# Patient Record
Sex: MALE | Race: OTHER | HISPANIC OR LATINO | URBAN - METROPOLITAN AREA
[De-identification: names, ages, dates, MRNs, and addresses within clinical notes are randomized per-mention and may not be internally consistent; named-entity substitution may affect disease eponyms.]

---
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
## 2020-01-21 NOTE — ED PROVIDER NOTE - CROS ED NEURO ALL NEG
Patient: Edmond Gaytan Date of Service: 2020   : 1951 MRN: [id number]     SUBJECTIVE:     HISTORY OF PRESENT ILLNESS:  Edmond Gaytan is a 68 year old male who presents today for follow up    Doing home therapy exercises  When he was going to PT, he was having fatigue the day after     Was getting better, then as he was not going the last 3 weeks he feels he has gotten weaker     No falls     Most of the time does not have pain, comes and goes on his back depending on what he is doing   Thinks triggered today from anxiety   Gets worked up leaving the house       No SE to the meds that he remembers    Sleep- able to sleeps 11-12 hours daily     Eating ok    Bowels- moving regularly   Urinating regularly too      PAST MEDICAL HISTORY:  Past Medical History:   Diagnosis Date   • 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   • baclofen (LIORESAL) 10 MG tablet Take 1 tablet by mouth 3 times daily as needed (muscle spasm).   • acetaminophen (TYLENOL) 500 MG tablet    • ALPRAZolam (XANAX) 0.5 MG tablet Take 1 tablet by mouth 2 times daily as needed for Anxiety (prior to mri).   • celecoxib (CELEBREX) 200 MG capsule Take 200 mg by mouth.   • polyethylene glycol (MIRALAX) powder      No current facility-administered medications for this visit.        ALLERGIES:  ALLERGIES:  No Known Allergies    PAST SURGICAL HISTORY:  Past Surgical History:   Procedure Laterality Date   • Lumbar fusion         FAMILY HISTORY:  Family History   Problem Relation Age of Onset   • Heart disease Father        SOCIAL HISTORY:  Social History     Tobacco Use   • Smoking status: Current Some Day Smoker     Types: Cigarettes     Start date: 1966     Last attempt to quit: 2016     Years  since quitting: 3.5   • Smokeless tobacco: Never Used   • Tobacco comment: Smokes seldom.   Substance Use Topics   • Alcohol use: Yes     Alcohol/week: 7.0 - 14.0 standard drinks     Types: 7 - 14 Standard drinks or equivalent per week   • Drug use: No       Review of Systems      OBJECTIVE:     Physical Exam   Constitutional: He appears well-developed and well-nourished.   HENT:   Head: Normocephalic and atraumatic.   Eyes: Conjunctivae are normal.   Cardiovascular: Normal rate, regular rhythm and normal heart sounds.   Pulmonary/Chest: Effort normal and breath sounds normal. He has no wheezes. Musculoskeletal:      Comments: Wasting at the quads, difficulty getting up from seated position, using walker to walk     Neurological: He is alert.   Skin: Skin is warm.   Psychiatric:   dysthymic   Nursing note and vitals reviewed.      Visit Vitals  /80   Pulse 103   Temp 97.6 °F (36.4 °C)   Ht 6' (1.829 m)   Wt 71.7 kg (158 lb)   SpO2 97%   BMI 21.43 kg/m²         Wt Readings from Last 1 Encounters:   01/21/20 71.7 kg (158 lb)          Assessment AND PLAN:     This is a 68 year old year-old male who presents with    Diagnoses and all orders for this visit:  S/P lumbar spinal fusion  -     SERVICE TO PHYSICAL THERAPY  Decreased strength  -     SERVICE TO PHYSICAL THERAPY  Weakness of both lower extremities  -     SERVICE TO PHYSICAL THERAPY  Other orders  -     gabapentin (NEURONTIN) 300 MG capsule; Take 1 capsule by mouth 3 times daily.  -     baclofen (LIORESAL) 10 MG tablet; Take 1 tablet by mouth 3 times daily as needed (muscle spasm).  restart gabapentin  Likely was helping with the pain, but stopping it has exacerbated it  Cont celebrex  Add prn baclofen  Back to PT  Discussed chronic pain- recommend doing something different- art/hobby etc  Focus the energy on something else  Aware of SE of meds, take with food    The patient indicated understanding of the diagnosis and agreed with the plan of  care.      Mila Pillai, DO  1/21/2020   - - -

## 2020-07-11 ENCOUNTER — EMERGENCY (EMERGENCY)
Facility: HOSPITAL | Age: 20
LOS: 1 days | Discharge: HOME | End: 2020-07-11
Attending: STUDENT IN AN ORGANIZED HEALTH CARE EDUCATION/TRAINING PROGRAM
Payer: COMMERCIAL

## 2020-07-11 VITALS
SYSTOLIC BLOOD PRESSURE: 121 MMHG | OXYGEN SATURATION: 100 % | RESPIRATION RATE: 18 BRPM | DIASTOLIC BLOOD PRESSURE: 76 MMHG | TEMPERATURE: 98 F | HEART RATE: 56 BPM

## 2020-07-11 VITALS
HEIGHT: 72 IN | RESPIRATION RATE: 20 BRPM | WEIGHT: 199.96 LBS | OXYGEN SATURATION: 98 % | TEMPERATURE: 98 F | HEART RATE: 71 BPM | DIASTOLIC BLOOD PRESSURE: 82 MMHG | SYSTOLIC BLOOD PRESSURE: 129 MMHG

## 2020-07-11 LAB
ALBUMIN SERPL ELPH-MCNC: 4.4 G/DL — SIGNIFICANT CHANGE UP (ref 3.3–5.2)
ALP SERPL-CCNC: 87 U/L — SIGNIFICANT CHANGE UP (ref 40–120)
ALT FLD-CCNC: 11 U/L — SIGNIFICANT CHANGE UP
ANION GAP SERPL CALC-SCNC: 15 MMOL/L — SIGNIFICANT CHANGE UP (ref 5–17)
AST SERPL-CCNC: 12 U/L — SIGNIFICANT CHANGE UP
BASOPHILS # BLD AUTO: 0.04 K/UL — SIGNIFICANT CHANGE UP (ref 0–0.2)
BASOPHILS NFR BLD AUTO: 0.2 % — SIGNIFICANT CHANGE UP (ref 0–2)
BILIRUB SERPL-MCNC: 0.9 MG/DL — SIGNIFICANT CHANGE UP (ref 0.4–2)
BUN SERPL-MCNC: 9 MG/DL — SIGNIFICANT CHANGE UP (ref 8–20)
CALCIUM SERPL-MCNC: 9.4 MG/DL — SIGNIFICANT CHANGE UP (ref 8.6–10.2)
CHLORIDE SERPL-SCNC: 102 MMOL/L — SIGNIFICANT CHANGE UP (ref 98–107)
CO2 SERPL-SCNC: 21 MMOL/L — LOW (ref 22–29)
CREAT SERPL-MCNC: 0.76 MG/DL — SIGNIFICANT CHANGE UP (ref 0.5–1.3)
EOSINOPHIL # BLD AUTO: 0.02 K/UL — SIGNIFICANT CHANGE UP (ref 0–0.5)
EOSINOPHIL NFR BLD AUTO: 0.1 % — SIGNIFICANT CHANGE UP (ref 0–6)
GLUCOSE SERPL-MCNC: 98 MG/DL — SIGNIFICANT CHANGE UP (ref 70–99)
HCT VFR BLD CALC: 42.4 % — SIGNIFICANT CHANGE UP (ref 39–50)
HGB BLD-MCNC: 14.7 G/DL — SIGNIFICANT CHANGE UP (ref 13–17)
IMM GRANULOCYTES NFR BLD AUTO: 0.7 % — SIGNIFICANT CHANGE UP (ref 0–1.5)
LACTATE BLDV-MCNC: 0.6 MMOL/L — SIGNIFICANT CHANGE UP (ref 0.5–2)
LIDOCAIN IGE QN: 11 U/L — LOW (ref 22–51)
LYMPHOCYTES # BLD AUTO: 1.83 K/UL — SIGNIFICANT CHANGE UP (ref 1–3.3)
LYMPHOCYTES # BLD AUTO: 9.4 % — LOW (ref 13–44)
MCHC RBC-ENTMCNC: 29 PG — SIGNIFICANT CHANGE UP (ref 27–34)
MCHC RBC-ENTMCNC: 34.7 GM/DL — SIGNIFICANT CHANGE UP (ref 32–36)
MCV RBC AUTO: 83.6 FL — SIGNIFICANT CHANGE UP (ref 80–100)
MONOCYTES # BLD AUTO: 2.16 K/UL — HIGH (ref 0–0.9)
MONOCYTES NFR BLD AUTO: 11.2 % — SIGNIFICANT CHANGE UP (ref 2–14)
NEUTROPHILS # BLD AUTO: 15.18 K/UL — HIGH (ref 1.8–7.4)
NEUTROPHILS NFR BLD AUTO: 78.4 % — HIGH (ref 43–77)
PLATELET # BLD AUTO: 242 K/UL — SIGNIFICANT CHANGE UP (ref 150–400)
POTASSIUM SERPL-MCNC: 3.4 MMOL/L — LOW (ref 3.5–5.3)
POTASSIUM SERPL-SCNC: 3.4 MMOL/L — LOW (ref 3.5–5.3)
PROT SERPL-MCNC: 7.9 G/DL — SIGNIFICANT CHANGE UP (ref 6.6–8.7)
RBC # BLD: 5.07 M/UL — SIGNIFICANT CHANGE UP (ref 4.2–5.8)
RBC # FLD: 12.6 % — SIGNIFICANT CHANGE UP (ref 10.3–14.5)
SODIUM SERPL-SCNC: 138 MMOL/L — SIGNIFICANT CHANGE UP (ref 135–145)
WBC # BLD: 19.37 K/UL — HIGH (ref 3.8–10.5)
WBC # FLD AUTO: 19.37 K/UL — HIGH (ref 3.8–10.5)

## 2020-07-11 PROCEDURE — 96374 THER/PROPH/DIAG INJ IV PUSH: CPT | Mod: XU

## 2020-07-11 PROCEDURE — 36415 COLL VENOUS BLD VENIPUNCTURE: CPT

## 2020-07-11 PROCEDURE — 85027 COMPLETE CBC AUTOMATED: CPT

## 2020-07-11 PROCEDURE — 83735 ASSAY OF MAGNESIUM: CPT

## 2020-07-11 PROCEDURE — 99285 EMERGENCY DEPT VISIT HI MDM: CPT

## 2020-07-11 PROCEDURE — 99284 EMERGENCY DEPT VISIT MOD MDM: CPT | Mod: 25

## 2020-07-11 PROCEDURE — 96375 TX/PRO/DX INJ NEW DRUG ADDON: CPT

## 2020-07-11 PROCEDURE — 83605 ASSAY OF LACTIC ACID: CPT

## 2020-07-11 PROCEDURE — 74177 CT ABD & PELVIS W/CONTRAST: CPT | Mod: 26

## 2020-07-11 PROCEDURE — 74177 CT ABD & PELVIS W/CONTRAST: CPT

## 2020-07-11 PROCEDURE — 80053 COMPREHEN METABOLIC PANEL: CPT

## 2020-07-11 PROCEDURE — 83690 ASSAY OF LIPASE: CPT

## 2020-07-11 RX ORDER — ONDANSETRON 8 MG/1
1 TABLET, FILM COATED ORAL
Qty: 6 | Refills: 0
Start: 2020-07-11

## 2020-07-11 RX ORDER — FAMOTIDINE 10 MG/ML
20 INJECTION INTRAVENOUS ONCE
Refills: 0 | Status: COMPLETED | OUTPATIENT
Start: 2020-07-11 | End: 2020-07-11

## 2020-07-11 RX ORDER — ONDANSETRON 8 MG/1
4 TABLET, FILM COATED ORAL ONCE
Refills: 0 | Status: COMPLETED | OUTPATIENT
Start: 2020-07-11 | End: 2020-07-11

## 2020-07-11 RX ADMIN — ONDANSETRON 4 MILLIGRAM(S): 8 TABLET, FILM COATED ORAL at 04:02

## 2020-07-11 RX ADMIN — FAMOTIDINE 20 MILLIGRAM(S): 10 INJECTION INTRAVENOUS at 04:02

## 2020-07-11 NOTE — ED PROVIDER NOTE - OBJECTIVE STATEMENT
21 y/o M pt with hx of appendectomy presenting today with cc of abdominal pain onset 12 hours pta. Pt states that he began to feel a vague, crampy abdominal pain in his epigastric region yesterday afternoon. Then tonight developed n/v and has been unable to keep any PO intake down since. States that temp at home was 102, took motrin at approx 2300. Pt denies any blood in vomitus. Last BM was tonight and was normal. Denies alcohol or drug use. Pt denies any chest pain, dyspnea, dysuria, headache, cough, congestion, sore throat, neck pain, back pain, weakness, numbness, tingling, dizziness, syncope, or other complaint.

## 2020-07-11 NOTE — ED ADULT TRIAGE NOTE - CHIEF COMPLAINT QUOTE
pt c/o abd pain and vomiting since 9pm last night and fever at home. pt states he took ibuprofen at 11pm. pt denies chest pain/SOB. denies recent travel or sick contacts.

## 2020-07-11 NOTE — ED PROVIDER NOTE - ATTENDING CONTRIBUTION TO CARE
30yo male with psh of appendectomy presents with abd pain for 6 hours. Pt states yesterday he woke up was feeling well, went to work and started to have vague abd pain. Pt states 6 hours ago started to have worsening epigastric pain and nausea and vomiting, last BM at 10pm, no diarrhea.   Tmax - 102  Const: Awake, alert and oriented. In no acute distress. Well appearing.  HEENT: NC/AT. Moist mucous membranes.  Eyes: No scleral icterus. EOMI.  Neck:. Soft and supple. Full ROM without pain.  Cardiac: Regular rate and regular rhythm. +S1/S2. Peripheral pulses 2+ and symmetric. No LE edema.  Resp: Speaking in full sentences. No evidence of respiratory distress. No wheezes, rales or rhonchi.  Abd: Soft, ttp diffusely, non-distended. Normal bowel sounds in all 4 quadrants. No guarding or rebound.  Back: Spine midline and non-tender. No CVAT.  Skin: No rashes, abrasions or lacerations.  Lymph: No cervical lymphadenopathy.  Neuro: Awake, alert & oriented x 3. Moves all extremities symmetrically.  labs, ct, GI cocktail

## 2020-07-11 NOTE — ED PROVIDER NOTE - PATIENT PORTAL LINK FT
You can access the FollowMyHealth Patient Portal offered by [facility] by registering at the following website: [email or web address]. By joining [facility]’s FollowMyHealth portal, you will also be able to view your health information using other applications (apps) compatible with our system.

## 2020-07-11 NOTE — ED PROVIDER NOTE - CLINICAL SUMMARY MEDICAL DECISION MAKING FREE TEXT BOX
19 y/o M pt with hx of appendecomy presenting with 1 day of n/v and abdominal pain. Reported fever at home, afebrile currently. Will check labs, lipase, treat for gastritis and reeval.

## 2020-07-11 NOTE — ED PROVIDER NOTE - PHYSICAL EXAMINATION
Gen: no acute distress  Head: normocephalic, atraumatic  Lung: CTAB, no respiratory distress, no wheezing, rales, rhonchi  CV: normal s1/s2, rrr  Abd: soft, mild epigastric tenderness on deep palpation, non-distended  MSK: No edema, no visible deformities, full range of motion in all 4 extremities  Neuro: No focal neurologic deficits  Skin: No rash   Psych: normal affect

## 2020-07-11 NOTE — ED ADULT NURSE REASSESSMENT NOTE - NS ED NURSE REASSESS COMMENT FT1
Patient resting in stretcher, VSS at this time. No s/s of apparent distress noted. PO challenge in progress, tolerating cary crackers and water at this time. Plan for d/c shortly.

## 2020-07-11 NOTE — ED PROVIDER NOTE - PROGRESS NOTE DETAILS
Reviewed negative w/u results with pt. Pt reports feeling much improved. Repeat abdominal exam soft and non-tender. Pt tolerating PO and is comfortable with plan for d/c. Pt agrees to f/u with pcp. Return instructions given, questions answered.

## 2020-11-23 ENCOUNTER — TRANSCRIPTION ENCOUNTER (OUTPATIENT)
Age: 20
End: 2020-11-23

## 2021-02-13 ENCOUNTER — APPOINTMENT (OUTPATIENT)
Dept: FAMILY MEDICINE | Facility: CLINIC | Age: 21
End: 2021-02-13
Payer: MEDICAID

## 2021-02-13 VITALS
TEMPERATURE: 98 F | HEART RATE: 80 BPM | OXYGEN SATURATION: 98 % | HEIGHT: 72 IN | WEIGHT: 191 LBS | SYSTOLIC BLOOD PRESSURE: 114 MMHG | BODY MASS INDEX: 25.87 KG/M2 | DIASTOLIC BLOOD PRESSURE: 70 MMHG

## 2021-02-13 DIAGNOSIS — Z23 ENCOUNTER FOR IMMUNIZATION: ICD-10-CM

## 2021-02-13 DIAGNOSIS — Z90.49 ACQUIRED ABSENCE OF OTHER SPECIFIED PARTS OF DIGESTIVE TRACT: ICD-10-CM

## 2021-02-13 DIAGNOSIS — Z13.220 ENCOUNTER FOR SCREENING FOR LIPOID DISORDERS: ICD-10-CM

## 2021-02-13 DIAGNOSIS — Z82.61 FAMILY HISTORY OF ARTHRITIS: ICD-10-CM

## 2021-02-13 DIAGNOSIS — Z13.31 ENCOUNTER FOR SCREENING FOR DEPRESSION: ICD-10-CM

## 2021-02-13 DIAGNOSIS — Z82.49 FAMILY HISTORY OF ISCHEMIC HEART DISEASE AND OTHER DISEASES OF THE CIRCULATORY SYSTEM: ICD-10-CM

## 2021-02-13 DIAGNOSIS — Z13.21 ENCOUNTER FOR SCREENING FOR NUTRITIONAL DISORDER: ICD-10-CM

## 2021-02-13 DIAGNOSIS — K21.9 GASTRO-ESOPHAGEAL REFLUX DISEASE W/OUT ESOPHAGITIS: ICD-10-CM

## 2021-02-13 PROCEDURE — 96127 BRIEF EMOTIONAL/BEHAV ASSMT: CPT

## 2021-02-13 PROCEDURE — 99385 PREV VISIT NEW AGE 18-39: CPT | Mod: 25

## 2021-02-13 PROCEDURE — 99072 ADDL SUPL MATRL&STAF TM PHE: CPT

## 2021-02-13 NOTE — HEALTH RISK ASSESSMENT
[No] : No [2] : 1) Little interest or pleasure doing things for more than half of the days (2) [0] : 2) Feeling down, depressed, or hopeless: Not at all (0) [de-identified] : exercises regularly  [] : No [de-identified] : balanced, stopped eating fast food [[id number]] : 1

## 2021-02-13 NOTE — PLAN
[FreeTextEntry1] : GERD\par - trial of PPI\par - counseled on lifestyle modifications \par \par HCM\par - f/u labs\par - encourage healthy diet and exercise

## 2021-02-13 NOTE — PHYSICAL EXAM
[No Acute Distress] : no acute distress [Well-Appearing] : well-appearing [Normal Sclera/Conjunctiva] : normal sclera/conjunctiva [PERRL] : pupils equal round and reactive to light [Normal Outer Ear/Nose] : the outer ears and nose were normal in appearance [Normal TMs] : both tympanic membranes were normal [No Respiratory Distress] : no respiratory distress  [No Accessory Muscle Use] : no accessory muscle use [Clear to Auscultation] : lungs were clear to auscultation bilaterally [Normal Rate] : normal rate  [Regular Rhythm] : with a regular rhythm [Soft] : abdomen soft [Non Tender] : non-tender [Non-distended] : non-distended [Normal Bowel Sounds] : normal bowel sounds [No Joint Swelling] : no joint swelling [Grossly Normal Strength/Tone] : grossly normal strength/tone [No Rash] : no rash [Normal Affect] : the affect was normal [No Focal Deficits] : no focal deficits [Normal Insight/Judgement] : insight and judgment were intact

## 2021-02-13 NOTE — HISTORY OF PRESENT ILLNESS
[FreeTextEntry1] : establish care, CPE [de-identified] : 20 year old male with no sig pmhx presents to establish care. He feels well overall. Admits to mild depression due to losing his job. He states it is manageable. He also complains of burning sensation in chest after eating and feels something coming up his throat. At times feels like food will come back up. Has been happening for past 3-4 months.

## 2021-02-16 LAB
25(OH)D3 SERPL-MCNC: 20.2 NG/ML
ALBUMIN SERPL ELPH-MCNC: 4.8 G/DL
ALP BLD-CCNC: 100 U/L
ALT SERPL-CCNC: 18 U/L
ANION GAP SERPL CALC-SCNC: 14 MMOL/L
AST SERPL-CCNC: 14 U/L
BASOPHILS # BLD AUTO: 0.04 K/UL
BASOPHILS NFR BLD AUTO: 0.5 %
BILIRUB SERPL-MCNC: 0.6 MG/DL
BUN SERPL-MCNC: 8 MG/DL
CALCIUM SERPL-MCNC: 9.9 MG/DL
CHLORIDE SERPL-SCNC: 101 MMOL/L
CHOLEST SERPL-MCNC: 142 MG/DL
CO2 SERPL-SCNC: 24 MMOL/L
CREAT SERPL-MCNC: 1 MG/DL
EOSINOPHIL # BLD AUTO: 0.32 K/UL
EOSINOPHIL NFR BLD AUTO: 4.3 %
ESTIMATED AVERAGE GLUCOSE: 100 MG/DL
GLUCOSE SERPL-MCNC: 85 MG/DL
HBA1C MFR BLD HPLC: 5.1 %
HCT VFR BLD CALC: 46.3 %
HDLC SERPL-MCNC: 36 MG/DL
HGB BLD-MCNC: 15.5 G/DL
IMM GRANULOCYTES NFR BLD AUTO: 0.3 %
LDLC SERPL CALC-MCNC: 91 MG/DL
LYMPHOCYTES # BLD AUTO: 2.86 K/UL
LYMPHOCYTES NFR BLD AUTO: 38.5 %
MAN DIFF?: NORMAL
MCHC RBC-ENTMCNC: 28.4 PG
MCHC RBC-ENTMCNC: 33.5 GM/DL
MCV RBC AUTO: 84.8 FL
MONOCYTES # BLD AUTO: 0.78 K/UL
MONOCYTES NFR BLD AUTO: 10.5 %
NEUTROPHILS # BLD AUTO: 3.41 K/UL
NEUTROPHILS NFR BLD AUTO: 45.9 %
NONHDLC SERPL-MCNC: 105 MG/DL
PLATELET # BLD AUTO: 293 K/UL
POTASSIUM SERPL-SCNC: 4.1 MMOL/L
PROT SERPL-MCNC: 7.9 G/DL
RBC # BLD: 5.46 M/UL
RBC # FLD: 12.8 %
SODIUM SERPL-SCNC: 139 MMOL/L
TRIGL SERPL-MCNC: 73 MG/DL
TSH SERPL-ACNC: 2.45 UIU/ML
WBC # FLD AUTO: 7.43 K/UL

## 2021-02-18 ENCOUNTER — APPOINTMENT (OUTPATIENT)
Dept: FAMILY MEDICINE | Facility: CLINIC | Age: 21
End: 2021-02-18

## 2021-02-18 DIAGNOSIS — Z20.822 CONTACT WITH AND (SUSPECTED) EXPOSURE TO COVID-19: ICD-10-CM

## 2021-04-01 DIAGNOSIS — Z11.59 ENCOUNTER FOR SCREENING FOR OTHER VIRAL DISEASES: ICD-10-CM

## 2021-04-02 LAB
COVID-19 NUCLEOCAPSID  GAM ANTIBODY INTERPRETATION: NEGATIVE
SARS-COV-2 AB SERPL QL IA: 0.07 INDEX

## 2021-09-05 NOTE — DISCHARGE NOTE PROVIDER - NSCORESITESY/N_GEN_A_CORE_RD
[facility]  Progress Note Pilar Palacios 1947, 76 y o  male MRN: [id number]  Unit/Bed#: -01 Encounter: [id number]  Primary Care Provider: Apple Pagan   Date and time admitted to hospital: 9/3/2021 11:36 AM    Gram-negative bacteremia  Assessment & Plan  Patient does have 1/2 blood cultures positive for Gram Negative Bhupinder Enteric Like    Source for this is unclear  He does have some bronchitis but this typically would not cause Gram-negative bacteremia  Abdominal exam is benign  No clinical signs of cholecystitis  CBD dilated but chronically apparently  No cholestatic pattern  Denies any diarrhea    Continue antibiotics, currently on aztreonam vancomycin  Follow-up final results of cultures    * Sepsis [facility])  Assessment & Plan  · Patient stated he woke up with fever and rigors the morning of admission and did state he had an episode of diarrhea today    Patient denies eating anything out of the ordinary yesterday and denies eating any shellfish or undercooked meats  · Patient is vaccinated and no on else in the family is currently sick  · Sepsis with unknown source; patient with temp of 101 7°, respirations 22, platelets 88, and lactic acid of 2 3  · AST and ALT markedly elevated  · CT chest abdomen pelvis-mild bronchial wall thickening; no acute abnormality within abdomen or pelvis; several enhancing foci within posterior right hepatic lobe possibly representing hemangiomas; horseshoe kidney  · Chest x-ray-no acute cardiopulmonary disease  · Lactic acid 2 3  · WBC count normal    Possibilities include tracheobronchitis, infectious diarrhea/viral now resolved, or more concerning left knee prosthetic joint infection    Plan  · Continue aztreonam given cefazolin and penicillin allergy  · Continue vancomycin as well for now  · Follow-up final results of cultures  · Obtain orthopedics consultation given L knee pain previously and hx of joint infection - case discussed, they will obtain x-rays  · Monitor clinically, temperature curve, white count    DM (diabetes mellitus), type 2 [facility])  Assessment & Plan  No results found for: HGBA1C    Recent Labs     09/04/21  1120 09/04/21  1622 09/04/21  2042 09/05/21  0722   POCGLU 179* 216* 228* 165*       Blood Sugar Average: Last 72 hrs:  · (P) 062 [id number] hold oral antihyperglycemics  · Correctional scale insulin  · Glargine 28 units at night  · lispro 12 units with dinner  · NovoLog 70/3022 units in the morning      Thrombocytopathia (HCC)  Assessment & Plan  · Platelets 88; upon chart review patient has had slight downtrend in platelets and baseline appears to be around 100  · May be secondary to sepsis  · No signs of bleeding  · Monitor    Elevated troponin  Assessment & Plan  · Mildly elevated troponin with flat curve is likely non MI troponin elevation  Patient denies any chest pain  Transaminitis  Assessment & Plan  · Noted to have elevated AST and ALT  · Hepatitis panel negative  · Right upper quadrant ultrasound reveals cholelithiasis but no cholecystitis, no CBD dilation  · Etiology may be secondary to sepsis and fatty liver; will continue monitor    Monitor CMP    Hypothyroidism  Assessment & Plan  Continue levothyroxine 50 mcg daily    Hypomagnesemia  Assessment & Plan  · Magnesium was replaced  · Currently much better    Monitor    Hyperlipidemia associated with type 2 diabetes mellitus ([facility] [address] )  Assessment & Plan  Given markedly elevated AST and ALT will hold statin at this time      Horseshoe kidney with renal calculus  Assessment & Plan  Plan is under CKD    History of endocarditis  Assessment & Plan  · History of endocarditis in 2013 with bioprosthetic aortic valve placed in 2013       CKD (chronic kidney disease) stage 3, GFR 30-59 ml/min [facility])  Assessment & Plan  Lab Results   Component Value Date    EGFR 78 09/05/2021    EGFR 63 09/04/2021    EGFR 58 09/03/2021    CREATININE 0 96 09/05/2021 CREATININE 1 14 2021    CREATININE 1 22 2021     · Patient with history of horseshoe kidney  · Creatinine at baseline currently; baseline appears to be 1-1 2  · Continue to monitor    Benign prostatic hyperplasia without lower urinary tract symptoms  Assessment & Plan  Continue tamsulosin    CAD (coronary artery disease)  Assessment & Plan  · Stable at this time  · Continue propranolol 60 mg daily      VTE Pharmacologic Prophylaxis:   Pharmacologic: Heparin  Mechanical VTE Prophylaxis in Place: Yes    Patient Centered Rounds: I have performed bedside rounds with nursing staff today  Discussions with Specialists or Other Care Team Provider:  Discussed with care management team    Education and Discussions with Family / Patient:  Patient    Time Spent for Care: 30 minutes  More than 50% of total time spent on counseling and coordination of care as described above  Current Length of Stay: 2 day(s)    Current Patient Status: Inpatient   Certification Statement: The patient will continue to require additional inpatient hospital stay due to need for IV antibiotics    Discharge Plan:  Once stable    Code Status: Level 3 - DNAR and DNI      Subjective:     Patient evaluated this morning  Pain left knee is much better  He has blood culture is positive on 2  No further fevers  Other events reported  Objective:     Vitals:   Temp (24hrs), Av 6 °F (37 °C), Min:98 4 °F (36 9 °C), Max:98 7 °F (37 1 °C)    Temp:  [98 4 °F (36 9 °C)-98 7 °F (37 1 °C)] 98 7 °F (37 1 °C)  HR:  [56-77] 65  Resp:  [17-20] 17  BP: (131-175)/(60-86) 175/86  SpO2:  [90 %-95 %] 95 %  Body mass index is 35 16 kg/m²  Input and Output Summary (last 24 hours): Intake/Output Summary (Last 24 hours) at 2021 1331  Last data filed at 2021 0912  Gross per 24 hour   Intake 420 ml   Output 3900 ml   Net -3480 ml       Physical Exam:     Physical Exam  Vitals and nursing note reviewed     Constitutional:       Appearance: Normal appearance  Comments: Elderly male in bed, awake   HENT:      Head: Normocephalic and atraumatic  Right Ear: External ear normal       Left Ear: External ear normal       Nose: Nose normal  No congestion or rhinorrhea  Mouth/Throat:      Mouth: Mucous membranes are moist       Pharynx: Oropharynx is clear  No oropharyngeal exudate or posterior oropharyngeal erythema  Eyes:      General: No scleral icterus  Right eye: No discharge  Left eye: No discharge  Pupils: Pupils are equal, round, and reactive to light  Neck:      Vascular: No carotid bruit  Cardiovascular:      Rate and Rhythm: Normal rate and regular rhythm  Pulses: Normal pulses  Heart sounds: No murmur heard  No friction rub  No gallop  Pulmonary:      Effort: Pulmonary effort is normal  No respiratory distress  Breath sounds: Normal breath sounds  No stridor  No wheezing, rhonchi or rales  Abdominal:      General: Abdomen is flat  Bowel sounds are normal  There is no distension  Palpations: Abdomen is soft  There is no mass  Tenderness: There is no abdominal tenderness  There is no guarding or rebound  Hernia: No hernia is present  Musculoskeletal:         General: No swelling, tenderness, deformity or signs of injury  Normal range of motion  Cervical back: Normal range of motion  No rigidity  No muscular tenderness  Comments: Left knee exam is actually improved today   Lymphadenopathy:      Cervical: No cervical adenopathy  Skin:     General: Skin is warm and dry  Capillary Refill: Capillary refill takes less than 2 seconds  Coloration: Skin is not jaundiced or pale  Findings: No bruising or erythema  Neurological:      General: No focal deficit present  Mental Status: He is alert and oriented to person, place, and time  Mental status is at baseline  Cranial Nerves: No cranial nerve deficit  Sensory: No sensory deficit  Motor: No weakness  Coordination: Coordination normal       Deep Tendon Reflexes: Reflexes normal    Psychiatric:         Mood and Affect: Mood normal          Behavior: Behavior normal          Thought Content: Thought content normal          Judgment: Judgment normal            Additional Data:     Labs:    Results from last 7 days   Lab Units 09/05/21  0651   WBC Thousand/uL 4 53   HEMOGLOBIN g/dL 15 1   HEMATOCRIT % 45 5   PLATELETS Thousands/uL 83*   NEUTROS PCT % 65   LYMPHS PCT % 13*   MONOS PCT % 10   EOS PCT % 11*     Results from last 7 days   Lab Units 09/05/21  0651   SODIUM mmol/L 136   POTASSIUM mmol/L 4 6   CHLORIDE mmol/L 104   CO2 mmol/L 22   BUN mg/dL 12   CREATININE mg/dL 0 96   ANION GAP mmol/L 10   CALCIUM mg/dL 8 6   ALBUMIN g/dL 2 8*   TOTAL BILIRUBIN mg/dL 0 90   ALK PHOS U/L 108   ALT U/L 224*   AST U/L 87*   GLUCOSE RANDOM mg/dL 187*     Results from last 7 days   Lab Units 09/03/21  1659   INR  1 18     Results from last 7 days   Lab Units 09/05/21  0722 09/04/21  2042 09/04/21  1622 09/04/21  1120 09/04/21  0714 09/03/21  2133   POC GLUCOSE mg/dl 165* 228* 216* 179* 144* 140         Results from last 7 days   Lab Units 09/04/21  0451 09/03/21  1659 09/03/21  1338 09/03/21  1149   LACTIC ACID mmol/L  --  1 9 2 3* 2 2*   PROCALCITONIN ng/ml 0 91* 1 39*  --   --            * I Have Reviewed All Lab Data Listed Above  * Additional Pertinent Lab Tests Reviewed: Maria A [facility] Admission Reviewed      Recent Cultures (last 7 days):     Results from last 7 days   Lab Units 09/03/21  1338 09/03/21  1149   BLOOD CULTURE  No Growth at 24 hrs   Enterobacter aerogenes*   GRAM STAIN RESULT   --  Gram negative rods*       Last 24 Hours Medication List:   Current Facility-Administered Medications   Medication Dose Route Frequency Provider Last Rate    acetaminophen  650 mg Oral Q6H PRN Yazan Jimenez DO      aspirin  81 mg Oral Daily Yazan Jimenez DO      aztreonam  2,000 mg Intravenous Q8H Catherne Prabhu, DO 2,000 mg (09/05/21 9832)    gabapentin  800 mg Oral TID Catherrakesh Masonst, DO      guaiFENesin  600 mg Oral Q12H [address] Laith Su MD      heparin (porcine)  5,000 Units [facility] [place]      HYDROmorphone  0 5 mg Intravenous Q6H PRN Laith Su MD      insulin aspart protamine-insulin aspart  22 Units Subcutaneous Daily Catherrakesh Masonst, DO      insulin glargine  28 Units Subcutaneous HS Catherrakesh Masonst, DO      insulin lispro  1-5 Units Subcutaneous HS Dagoberto Masonst, DO      insulin lispro  12 Units Subcutaneous Daily With Colby Chase, DO      insulin lispro  2-12 Units Subcutaneous TID [facility] Catherne Prabhu, DO      levothyroxine  50 mcg Oral Daily Catherne Prabhu, DO      loratadine  10 mg Oral Daily Dagoberto Pelletier, DO      metroNIDAZOLE  500 mg Intravenous Q8H Cathdonaldo Masonst,  mg (09/05/21 1326)    oxyCODONE  5 mg Oral Q4H PRN Dagoberto Pelletier, DO      pramipexole  1 5 mg Oral BID Cathdonaldo Pelletier, DO      propranolol  60 mg Oral Daily Dagoberto Masonst, DO      tamsulosin  0 4 mg Oral Daily With Colby Chase, DO      vancomycin  15 mg/kg (Adjusted) Intravenous Q12H Cathdonaldo Pelletier, DO          Today, Patient Was Seen By: Laith Su MD    ** Please Note: Dictation voice to text software may have been used in the creation of this document   ** No

## 2021-09-25 ENCOUNTER — APPOINTMENT (OUTPATIENT)
Dept: FAMILY MEDICINE | Facility: CLINIC | Age: 21
End: 2021-09-25
Payer: MEDICAID

## 2021-09-25 VITALS
HEIGHT: 73 IN | OXYGEN SATURATION: 97 % | HEART RATE: 96 BPM | TEMPERATURE: 98.1 F | SYSTOLIC BLOOD PRESSURE: 114 MMHG | BODY MASS INDEX: 23.46 KG/M2 | DIASTOLIC BLOOD PRESSURE: 72 MMHG | WEIGHT: 177 LBS

## 2021-09-25 DIAGNOSIS — G43.909 MIGRAINE, UNSPECIFIED, NOT INTRACTABLE, W/OUT STATUS MIGRAINOSUS: ICD-10-CM

## 2021-09-25 DIAGNOSIS — J02.9 ACUTE PHARYNGITIS, UNSPECIFIED: ICD-10-CM

## 2021-09-25 PROCEDURE — 99214 OFFICE O/P EST MOD 30 MIN: CPT

## 2021-09-25 PROCEDURE — 99072 ADDL SUPL MATRL&STAF TM PHE: CPT

## 2021-09-27 NOTE — HISTORY OF PRESENT ILLNESS
[FreeTextEntry8] : Pt c/o insomnia, headaches, and throat pains. Headaches are associated w/ photosensitivity and inc stressors. Headaches start at temples and radiate to back of head. Pt experiences headaches 2-3x a week, usually when he does not get enough sleep. Pt only sleeps for 3 hrs/night since he got COVID infx 6 mos ago. Headaches started shortly thereafter. Pt smokes marijuana daily to try to help w/ sleep and relaxation. Melatonin and Nyquil has not helped.\par Pt c/o throat discomfort x 1 day. Pt has discomfort w/ swallowing.

## 2021-09-27 NOTE — ASSESSMENT
[FreeTextEntry1] : insomnia - trial of ambien to help normalize sleep patterns. advised pt on proper sleep hygiene including decreased caffeine and screen time in the PM hours.. f/u in 2-3 weeks to assess response.  checked\par migraines - imitrex prn. Possible adverse effects discussed with pt. possibly trigger by lack of sleep, will see if improved with improvement in sleep\par pharyngitis - rapid strep neg today. start lidocaine viscous prn

## 2021-09-27 NOTE — PHYSICAL EXAM
[Coordination Grossly Intact] : coordination grossly intact [No Focal Deficits] : no focal deficits [Normal] : affect was normal and insight and judgment were intact [de-identified] : tonsillar and posterior oropharynx erythema, no exudate

## 2021-10-25 ENCOUNTER — APPOINTMENT (OUTPATIENT)
Dept: FAMILY MEDICINE | Facility: CLINIC | Age: 21
End: 2021-10-25

## 2022-04-11 PROBLEM — Z11.59 SCREENING FOR VIRAL DISEASE: Status: ACTIVE | Noted: 2021-04-01

## 2022-09-07 ENCOUNTER — APPOINTMENT (OUTPATIENT)
Dept: FAMILY MEDICINE | Facility: CLINIC | Age: 22
End: 2022-09-07

## 2022-09-07 VITALS
TEMPERATURE: 97.2 F | OXYGEN SATURATION: 99 % | WEIGHT: 192 LBS | DIASTOLIC BLOOD PRESSURE: 80 MMHG | RESPIRATION RATE: 16 BRPM | SYSTOLIC BLOOD PRESSURE: 130 MMHG | HEART RATE: 77 BPM | HEIGHT: 73 IN | BODY MASS INDEX: 25.45 KG/M2

## 2022-09-07 PROCEDURE — 99395 PREV VISIT EST AGE 18-39: CPT

## 2022-09-07 NOTE — HEALTH RISK ASSESSMENT
[Very Good] : ~his/her~  mood as very good [Former] : Former [Reports changes in hearing] : Reports no changes in hearing [Reports changes in vision] : Reports no changes in vision [Reports changes in dental health] : Reports no changes in dental health

## 2022-09-07 NOTE — HISTORY OF PRESENT ILLNESS
[FreeTextEntry1] : annual [de-identified] : annual well active regular exercise poor diet  history of IV drug abuse stopped 4 months ago clean and going to meetings denies chest pain palpitaitons or dyspnea performs self testicular exam on no daily medications non smoker

## 2022-09-12 LAB
25(OH)D3 SERPL-MCNC: 36.4 NG/ML
ALBUMIN SERPL ELPH-MCNC: 4.7 G/DL
ALP BLD-CCNC: 82 U/L
ALT SERPL-CCNC: 21 U/L
ANION GAP SERPL CALC-SCNC: 15 MMOL/L
AST SERPL-CCNC: 18 U/L
BASOPHILS # BLD AUTO: 0.03 K/UL
BASOPHILS NFR BLD AUTO: 0.5 %
BILIRUB SERPL-MCNC: 0.3 MG/DL
BUN SERPL-MCNC: 11 MG/DL
C TRACH RRNA SPEC QL NAA+PROBE: NOT DETECTED
CALCIUM SERPL-MCNC: 9.7 MG/DL
CHLORIDE SERPL-SCNC: 102 MMOL/L
CHOLEST SERPL-MCNC: 148 MG/DL
CO2 SERPL-SCNC: 25 MMOL/L
CREAT SERPL-MCNC: 0.98 MG/DL
EGFR: 112 ML/MIN/1.73M2
EOSINOPHIL # BLD AUTO: 0.28 K/UL
EOSINOPHIL NFR BLD AUTO: 4.9 %
ESTIMATED AVERAGE GLUCOSE: 103 MG/DL
GLUCOSE SERPL-MCNC: 87 MG/DL
HAV IGM SER QL: NONREACTIVE
HBA1C MFR BLD HPLC: 5.2 %
HBV CORE IGM SER QL: NONREACTIVE
HBV SURFACE AG SER QL: NONREACTIVE
HCT VFR BLD CALC: 46.4 %
HCV AB SER QL: NONREACTIVE
HCV S/CO RATIO: 0.09 S/CO
HDLC SERPL-MCNC: 38 MG/DL
HGB BLD-MCNC: 15.9 G/DL
HIV1+2 AB SPEC QL IA.RAPID: NONREACTIVE
HSV 1+2 IGG SER IA-IMP: NEGATIVE
HSV 1+2 IGG SER IA-IMP: POSITIVE
HSV1 IGG SER QL: 14.4 INDEX
HSV2 IGG SER QL: 0.2 INDEX
IMM GRANULOCYTES NFR BLD AUTO: 0.2 %
LDLC SERPL CALC-MCNC: 95 MG/DL
LYMPHOCYTES # BLD AUTO: 2.84 K/UL
LYMPHOCYTES NFR BLD AUTO: 49.4 %
MAN DIFF?: NORMAL
MCHC RBC-ENTMCNC: 29.4 PG
MCHC RBC-ENTMCNC: 34.3 GM/DL
MCV RBC AUTO: 85.8 FL
MONOCYTES # BLD AUTO: 0.53 K/UL
MONOCYTES NFR BLD AUTO: 9.2 %
N GONORRHOEA RRNA SPEC QL NAA+PROBE: NOT DETECTED
NEUTROPHILS # BLD AUTO: 2.06 K/UL
NEUTROPHILS NFR BLD AUTO: 35.8 %
NONHDLC SERPL-MCNC: 111 MG/DL
PLATELET # BLD AUTO: 291 K/UL
POTASSIUM SERPL-SCNC: 4.2 MMOL/L
PROT SERPL-MCNC: 7.6 G/DL
RBC # BLD: 5.41 M/UL
RBC # FLD: 13 %
SODIUM SERPL-SCNC: 141 MMOL/L
SOURCE AMPLIFICATION: NORMAL
T PALLIDUM AB SER QL IA: NEGATIVE
T4 SERPL-MCNC: 7.4 UG/DL
TRIGL SERPL-MCNC: 80 MG/DL
TSH SERPL-ACNC: 2.55 UIU/ML
URATE SERPL-MCNC: 6.9 MG/DL
WBC # FLD AUTO: 5.75 K/UL

## 2022-09-14 LAB
HSV1 IGM SER QL: NEGATIVE
HSV2 AB FLD-ACNC: NEGATIVE

## 2023-02-16 ENCOUNTER — APPOINTMENT (OUTPATIENT)
Dept: FAMILY MEDICINE | Facility: CLINIC | Age: 23
End: 2023-02-16
Payer: MEDICAID

## 2023-02-16 VITALS
WEIGHT: 195 LBS | SYSTOLIC BLOOD PRESSURE: 142 MMHG | HEIGHT: 73 IN | OXYGEN SATURATION: 99 % | HEART RATE: 94 BPM | DIASTOLIC BLOOD PRESSURE: 84 MMHG | BODY MASS INDEX: 25.84 KG/M2 | TEMPERATURE: 97.8 F

## 2023-02-16 DIAGNOSIS — G47.00 INSOMNIA, UNSPECIFIED: ICD-10-CM

## 2023-02-16 PROCEDURE — 99214 OFFICE O/P EST MOD 30 MIN: CPT

## 2023-02-19 NOTE — ASSESSMENT
[FreeTextEntry1] : depression, insomnia, hx drug abuse - check utox. trial of trazodone. Possible adverse effects discussed with pt. if no improvement consider ambien prn.

## 2023-02-19 NOTE — HISTORY OF PRESENT ILLNESS
[FreeTextEntry8] : Pt c/o insomnia. Pt only able to sleep for 2 hrs/night. pt very tired due to lack of sleep. Pt very stressed as he is not working as  right now and bills are increasing. Pt has tried melatonin and zzquil which didn't help. Denies hx of snoring or apneic episodes. \par Pt was feeling depressed last year, was abusing benzos, opioids, marijuana at that time. Pt reports he is 9 mos sober, going to AA meetings 4x a week.

## 2023-02-19 NOTE — HEALTH RISK ASSESSMENT
[1] : 1) Little interest or pleasure doing things for several days (1) [3] : 2) Feeling down, depressed, or hopeless for nearly every day (3) [Several Days (1)] : 1.) Little interest or pleasure in doing things? Several days [Nearly Every Day (3)] : 6.) Feeling bad about yourself, or that you are a failure, or have let yourself or your family down? Nearly every day [Not at All (0)] : 8.) Moving or speaking so slowly that other people could have noticed, or the opposite, moving or speaking faster than usual? Not at all [Moderate] : severity of depression is moderate [Very Difficult] : How difficult have these problems made it for you to do your work, take care of things at home, or get along with people? Very difficult [PHQ-2 Positive] : PHQ-2 Positive [PHQ-9 Positive] : PHQ-9 Positive [I have developed a follow-up plan documented below in the note.] : I have developed a follow-up plan documented below in the note. [[id number]] : 13 [[id number]] : 4

## 2023-02-21 LAB
AMPHET UR-MCNC: NEGATIVE NG/ML
BARBITURATES UR-MCNC: NEGATIVE NG/ML
BENZODIAZ UR-MCNC: NEGATIVE NG/ML
COCAINE METAB.OTHER UR-MCNC: NEGATIVE NG/ML
CREATININE, URINE: 195.8 MG/DL
FENTANYL, URINE: NEGATIVE NG/ML
METHADONE UR-MCNC: NEGATIVE NG/ML
OPIATES UR-MCNC: NEGATIVE NG/ML
OXYCODONE/OXYMORPHONE, URINE: NEGATIVE NG/ML
PCP UR-MCNC: NEGATIVE NG/ML
PH, URINE: 7.8
PLEASE NOTE: DRUGSCRUR: NORMAL
THC UR QL: NEGATIVE NG/ML

## 2023-03-30 NOTE — ED STATDOCS - [facility] ED SYMPTOM NEG
pt is 84 year old female with hx of prothrombin mutation, PE, HTN, DVT, cholecystectomy, presents with SOB, temp and cough admitted with septic shock 2/2 severe CAP, strep pneumo bacteremia, mediastinal adenopathy enlarging.  no abdominal distention/no fever/no vomiting/no nausea

## 2023-04-25 ENCOUNTER — APPOINTMENT (OUTPATIENT)
Dept: FAMILY MEDICINE | Facility: CLINIC | Age: 23
End: 2023-04-25
Payer: MEDICAID

## 2023-04-25 VITALS
SYSTOLIC BLOOD PRESSURE: 132 MMHG | BODY MASS INDEX: 25.84 KG/M2 | HEIGHT: 73 IN | OXYGEN SATURATION: 97 % | WEIGHT: 195 LBS | HEART RATE: 120 BPM | TEMPERATURE: 98.1 F | DIASTOLIC BLOOD PRESSURE: 80 MMHG

## 2023-04-25 DIAGNOSIS — S62.309A UNSPECIFIED FRACTURE OF UNSPECIFIED METACARPAL BONE, INITIAL ENCOUNTER FOR CLOSED FRACTURE: ICD-10-CM

## 2023-04-25 PROCEDURE — 99214 OFFICE O/P EST MOD 30 MIN: CPT

## 2023-04-25 NOTE — PHYSICAL EXAM
[Normal] : no acute distress, well nourished, well developed and well-appearing [de-identified] : R 5th metacarpal sewlling, tenderness. Pt able to move digits and sensation wnl

## 2023-04-25 NOTE — ASSESSMENT
[FreeTextEntry1] : R 5th metacarpal fx - refer to ortho. ibuprofen 800mg tid prn. RICE advised\par depression - start trial of lexapro 10mg q day. Possible adverse effects discussed with pt. f/u in 2-3 mos
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
## 2023-04-28 ENCOUNTER — NON-APPOINTMENT (OUTPATIENT)
Age: 23
End: 2023-04-28

## 2023-04-28 ENCOUNTER — APPOINTMENT (OUTPATIENT)
Dept: ORTHOPEDIC SURGERY | Facility: CLINIC | Age: 23
End: 2023-04-28
Payer: MEDICAID

## 2023-04-28 VITALS
SYSTOLIC BLOOD PRESSURE: 145 MMHG | HEART RATE: 83 BPM | WEIGHT: 195 LBS | HEIGHT: 73 IN | DIASTOLIC BLOOD PRESSURE: 91 MMHG | BODY MASS INDEX: 25.84 KG/M2

## 2023-04-28 PROCEDURE — 29125 APPL SHORT ARM SPLINT STATIC: CPT | Mod: RT

## 2023-04-28 PROCEDURE — 99203 OFFICE O/P NEW LOW 30 MIN: CPT | Mod: 25

## 2023-04-28 NOTE — END OF VISIT
[FreeTextEntry3] : This note was written by Gayla Christine on 04/28/2023 acting solely as a scribe for Dr. Prince Friedman.\par  \par All medical record entries made by the Scribe were at my, Dr. Prince Friedman, direction and personally dictated by me on 04/28/2023. I have personally reviewed the chart and agree that the record accurately reflects my personal performance of the history, physical exam, assessment and plan.

## 2023-04-28 NOTE — DISCUSSION/SUMMARY
[FreeTextEntry1] : He has findings consistent with a minimally displaced right fifth metacarpal fracture after an injury 5 days ago while bowling.\par \par I had a discussion with the patient regarding today's visit, the prognosis of this diagnosis, and treatment recommendations and options. At this time,  I did tell him that while the fracture is minimally displaced, it is overall well aligned. I therefore recommended nonoperative management given the nature of the injury as documented above. I did tell him that in most cases, I place him into a short arm cast, however given the stiffness to the digit post injury, I do not believe he will tolerate a cast. He was therefore placed into a well molded well padded fiber glass ulnar gutter splint, in the intrinsic plus position.\par \par He has agreed to the above plan of management and has expressed full understanding.  All questions were fully answered to their satisfaction. \par \par My cumulative time spent on this visit included: Preparation for the visit, review of the medical records, review of pertinent diagnostic studies, examination and counseling of the patient on the above diagnosis, treatment plan and prognosis, orders of diagnostic tests, medication and/or appropriate procedures and documentation in the medical records of today's visit.

## 2023-04-28 NOTE — PHYSICAL EXAM
[de-identified] : - Constitutional: This is a male in no obvious distress.  He is accompanied by his girlfriend today.\par - Psych: Patient is alert and oriented to person, place and time.  Patient has a normal mood and affect.\par - Cardiovascular: Normal pulses throughout the upper extremities.  No significant varicosities are noted in the upper extremities. \par - Neuro: Strength and sensation are intact throughout the upper extremities.  Patient has normal coordination.\par - Respiratory:  Patient exhibits no evidence of shortness of breath or difficulty breathing.\par - Skin: No rashes, lesions, or other abnormalities are noted in the upper extremities.\par \par --- \par \par Examination of his right hand after the splint was removed demonstrates swelling and ecchymosis along the fifth metacarpal.  He has tenderness.  There is limitation of motion.  There is no obvious rotational deformity.  He is neurovascularly intact distally. [de-identified] : I reviewed radiographs of his right hand from 04/24/2023. These demonstrated a minimally displaced fifth metacarpal shaft fracture.

## 2023-04-28 NOTE — PROCEDURE
[FreeTextEntry1] : He was placed into a well-padded and well-molded right short arm ulnar gutter fiberglass splint in the intrinsic plus position. He was instructed on protection, ice and activity modification. He will follow-up in 2 weeks for x-rays out of the splint.

## 2023-04-28 NOTE — ADDENDUM
[FreeTextEntry1] : I, Gayla Christine, acted solely as a scribe for Dr. Friedman on this date on 04/28/2023.

## 2023-04-28 NOTE — HISTORY OF PRESENT ILLNESS
[Right] : right hand dominant [FreeTextEntry1] : He comes in today for evaluation of a right fifth metacarpal fracture sustained on Sunday 4/23/23 while bowling. He was seen the following day at [facility] where xrays were obtained and revealed the fracture. He was splinted and advised to follow up with a specialist.\par \par He is accompanied by his girlfriend.\par \par He was referred by his PCP, Dr. John Reyes.

## 2023-04-28 NOTE — CONSULT LETTER
[Dear  ___] : Dear  [unfilled], [Consult Letter:] : I had the pleasure of evaluating your patient, [unfilled]. [Please see my note below.] : Please see my note below. [Consult Closing:] : Thank you very much for allowing me to participate in the care of this patient.  If you have any questions, please do not hesitate to contact me. [Sincerely,] : Sincerely, [FreeTextEntry3] : Prince Friedman M.D.\par Surgery of the Hand and Upper Extremity\par Orthopaedic Surgery\par Chief, Hand Service, [facility]\par Director, Hand Service, [facility]\par  Of Orthopaedic Surgery, [facility] [employer] [facility] at [facility]\par [employer] Email: [email or web address]\par Office Phone: [phone]

## 2023-05-04 ENCOUNTER — NON-APPOINTMENT (OUTPATIENT)
Age: 23
End: 2023-05-04

## 2023-05-12 ENCOUNTER — APPOINTMENT (OUTPATIENT)
Dept: ORTHOPEDIC SURGERY | Facility: CLINIC | Age: 23
End: 2023-05-12
Payer: MEDICAID

## 2023-05-12 PROCEDURE — 29125 APPL SHORT ARM SPLINT STATIC: CPT | Mod: RT

## 2023-05-12 PROCEDURE — 99213 OFFICE O/P EST LOW 20 MIN: CPT | Mod: 25

## 2023-05-12 PROCEDURE — 73130 X-RAY EXAM OF HAND: CPT | Mod: RT

## 2023-05-12 NOTE — DISCUSSION/SUMMARY
[FreeTextEntry1] : I had a discussion regarding today's visit, the diagnosis and treatment recommendations and options.  We also discussed changes since the last visit.  At this time, given the fracture is not yet fully healed, I recommended continued immobilization in a short arm ulnar gutter splint. \par \par The patient has agreed to the above plan of management and has expressed full understanding.  All questions were fully answered to the patient's satisfaction.\par \par My cumulative time spent on today's visit included: Preparation for the visit, review of the medical records, review of pertinent diagnostic studies, examination and counseling of the patient on the above diagnosis, treatment plan and prognosis, orders of diagnostic tests, medications and/or appropriate procedures and documentation in the medical records of today's visit.

## 2023-05-12 NOTE — PHYSICAL EXAM
[de-identified] : - Constitutional: This is a male in no obvious distress.  He is accompanied by his girlfriend today.\par - Psych: Patient is alert and oriented to person, place and time.  Patient has a normal mood and affect.\par - Cardiovascular: Normal pulses throughout the upper extremities.  No significant varicosities are noted in the upper extremities. \par - Neuro: Strength and sensation are intact throughout the upper extremities.  Patient has normal coordination.\par - Respiratory:  Patient exhibits no evidence of shortness of breath or difficulty breathing.\par - Skin: No rashes, lesions, or other abnormalities are noted in the upper extremities.\par \par --- \par \par Examination of his right hand after the splint was removed demonstrates decreased swelling.  There is residual tenderness along the fifth metacarpal.  There is limitation of flexion and extension.  There is no obvious rotational deformity.  He is neurovascularly intact distally. [de-identified] : AP, lateral, and oblique radiographs of the right hand demonstrate his fifth metacarpal shaft fracture to be healing, in good overall alignment.  There is slight displacement, which is in acceptable parameters.

## 2023-05-12 NOTE — ADDENDUM
[FreeTextEntry1] : I, Gayla Christine, acted solely as a scribe for Dr. Friedman on this date on 05/12/2023.

## 2023-05-12 NOTE — HISTORY OF PRESENT ILLNESS
[FreeTextEntry1] : Follow-up regarding right fifth metacarpal fracture after an injury while bowling 19 days ago.  He was seen in the office 2 weeks ago when he was splinted.\par \par He is having continued pain as well as mild numbness. He rates his as a 5 out of 10.\par \par He is accompanied by his girlfriend.

## 2023-05-12 NOTE — END OF VISIT
[FreeTextEntry3] : This note was written by Gayla Christine on 05/12/2023 acting solely as a scribe for Dr. Prince Friedman.\par  \par All medical record entries made by the Scribe were at my, Dr. Prince Friedman, direction and personally dictated by me on 05/12/2023. I have personally reviewed the chart and agree that the record accurately reflects my personal performance of the history, physical exam, assessment and plan.

## 2023-05-18 ENCOUNTER — NON-APPOINTMENT (OUTPATIENT)
Age: 23
End: 2023-05-18

## 2023-05-26 ENCOUNTER — APPOINTMENT (OUTPATIENT)
Dept: ORTHOPEDIC SURGERY | Facility: CLINIC | Age: 23
End: 2023-05-26
Payer: MEDICAID

## 2023-05-26 PROCEDURE — 73130 X-RAY EXAM OF HAND: CPT | Mod: RT

## 2023-05-26 PROCEDURE — 99214 OFFICE O/P EST MOD 30 MIN: CPT

## 2023-05-26 NOTE — ADDENDUM
[FreeTextEntry1] : I, Gayla Christine, acted solely as a scribe for Dr. Friedman on this date on 05/26/2023.

## 2023-05-26 NOTE — END OF VISIT
[FreeTextEntry3] : This note was written by Gayla Christine on 05/26/2023 acting solely as a scribe for Dr. Prince Friedman.\par  \par All medical record entries made by the Scribe were at my, Dr. Prince Friedman, direction and personally dictated by me on 05/26/2023. I have personally reviewed the chart and agree that the record accurately reflects my personal performance of the history, physical exam, assessment and plan.

## 2023-05-26 NOTE — HISTORY OF PRESENT ILLNESS
[FreeTextEntry1] : 33 days status post right fifth metacarpal fracture.\par \par He is experiencing mild to moderate residual pain, notably at night. He also notes occasional numbness and tingling. 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 He has taken the splint off but states not for long periods of time.  He has been doing landscaping work using the hand.  He rates his pain as a 4 out of 10 at this time. \par \par He is accompanied by his girlfriend.

## 2023-05-26 NOTE — PHYSICAL EXAM
[de-identified] : - Constitutional: This is a male in no obvious distress.  He is accompanied by his girlfriend today.\par - Psych: Patient is alert and oriented to person, place and time.  Patient has a normal mood and affect.\par - Cardiovascular: Normal pulses throughout the upper extremities.  No significant varicosities are noted in the upper extremities. \par - Neuro: Strength and sensation are intact throughout the upper extremities.  Patient has normal coordination.\par - Respiratory:  Patient exhibits no evidence of shortness of breath or difficulty breathing.\par - Skin: No rashes, lesions, or other abnormalities are noted in the upper extremities.\par \par --- \par \par Examination of his right hand after the splint was removed demonstrates decreased swelling.  There is residual although decreased tenderness along the fifth metacarpal.  There is improved flexion and extension of the digits.  There is no obvious rotational deformity.  He is neurovascularly intact distally. [de-identified] : AP, lateral, and oblique radiographs of the right hand demonstrate his fifth metacarpal shaft fracture to be healing, in alignment.  There is slight interval change in position with some further ulnar deviation on the PA.  It is well aligned on the lateral.

## 2023-05-26 NOTE — DISCUSSION/SUMMARY
[FreeTextEntry1] : I had a discussion regarding today's visit, the diagnosis and treatment recommendations and options.  We also discussed changes since the last visit.  At this time, I reviewed today's xrays in great detail with the patient. Based upon today's radiographs, the fracture has shown to partially displaced.  I explained to him that he will have a small prominence dorsally to the hand due to the displacement, however functionally I do not believe he will likely have deficits. I told him that the only way to correct the prominence would be with surgery, which I told him I would not recommend unless he were to tell me otherwise. He stated that he is not interested in undergoing surgery at all and is fine with the slight deformity. He was placed into a right carpal tunnel splint to be worn full time. He was instructed on activity modification.  I stressed to him the importance of being compliant with this and not lifting or doing strenuous activities with his right hand.  He will follow up in 2 weeks for repeat xrays. \par \par The patient has agreed to the above plan of management and has expressed full understanding.  All questions were fully answered to the patient's satisfaction.\par \par My cumulative time spent on today's visit included: Preparation for the visit, review of the medical records, review of pertinent diagnostic studies, examination and counseling of the patient on the above diagnosis, treatment plan and prognosis, orders of diagnostic tests, medications and/or appropriate procedures and documentation in the medical records of today's visit.

## 2023-05-31 ENCOUNTER — NON-APPOINTMENT (OUTPATIENT)
Age: 23
End: 2023-05-31

## 2023-06-09 ENCOUNTER — APPOINTMENT (OUTPATIENT)
Dept: ORTHOPEDIC SURGERY | Facility: CLINIC | Age: 23
End: 2023-06-09
Payer: MEDICAID

## 2023-06-12 PROBLEM — S62.306A FRACTURE OF FIFTH METACARPAL BONE OF RIGHT HAND: Status: ACTIVE | Noted: 2023-04-28

## 2023-06-19 ENCOUNTER — APPOINTMENT (OUTPATIENT)
Dept: ORTHOPEDIC SURGERY | Facility: CLINIC | Age: 23
End: 2023-06-19
Payer: MEDICAID

## 2023-06-19 DIAGNOSIS — S62.306A UNSPECIFIED FRACTURE OF FIFTH METACARPAL BONE, RIGHT HAND, INITIAL ENCOUNTER FOR CLOSED FRACTURE: ICD-10-CM

## 2023-06-19 PROCEDURE — 99213 OFFICE O/P EST LOW 20 MIN: CPT

## 2023-06-19 PROCEDURE — 73130 X-RAY EXAM OF HAND: CPT | Mod: RT

## 2023-06-19 NOTE — REASON FOR VISIT
Last refill: 1/2/2020 for #60 with 0 refills  Last visit: 1/20/2020   [FreeTextEntry2] : Right fifth metacarpal fracture

## 2023-06-19 NOTE — PHYSICAL EXAM
[de-identified] : - Constitutional: This is a male in no obvious distress. \par - Psych: Patient is alert and oriented to person, place and time.  Patient has a normal mood and affect.\par - Cardiovascular: Normal pulses throughout the upper extremities.  No significant varicosities are noted in the upper extremities. \par - Neuro: Strength and sensation are intact throughout the upper extremities.  Patient has normal coordination.\par - Respiratory:  Patient exhibits no evidence of shortness of breath or difficulty breathing.\par - Skin: No rashes, lesions, or other abnormalities are noted in the upper extremities.\par \par --- \par \par Examination of his right hand after the splint was removed demonstrates decreased swelling.  There is residual although decreased tenderness along the fifth metacarpal.  There is a palpable prominence dorsally in the region of the fracture.  He has regained near full flexion and extension.  There is no obvious rotational deformity.  He is neurovascularly intact distally. [de-identified] : AP, lateral, and oblique radiographs of the right hand demonstrate his fifth metacarpal shaft fracture to be fully healed, without interval change in alignment.  Again, there is some displacement previously noted.  The fracture is nearly consolidated.

## 2023-06-19 NOTE — DISCUSSION/SUMMARY
[FreeTextEntry1] : I had a discussion regarding today's visit, the diagnosis and treatment recommendations and options.  We also discussed changes since the last visit.  At this time, I recommended weaning off of the splint over the next 2 weeks.  He will wear it as needed during certain activities.  He will avoid heavy lifting or strenuous activities without the splint.  He will follow-up in 4 weeks if needed, according to his symptoms.\par \par The patient has agreed to the above plan of management and has expressed full understanding.  All questions were fully answered to the patient's satisfaction.\par \par My cumulative time spent on today's visit included: Preparation for the visit, review of the medical records, review of pertinent diagnostic studies, examination and counseling of the patient on the above diagnosis, treatment plan and prognosis, orders of diagnostic tests, medications and/or appropriate procedures and documentation in the medical records of today's visit.

## 2023-06-19 NOTE — HISTORY OF PRESENT ILLNESS
[FreeTextEntry1] : 57 days status post right fifth metacarpal fracture.\par \par See note from when he was seen in the office 24 days ago.  There was further displacement of the fracture because he was using the hand removing the splint.  He deferred surgery.\par \par He is still having intermittent pain.

## 2023-07-05 ENCOUNTER — APPOINTMENT (OUTPATIENT)
Dept: DERMATOLOGY | Facility: CLINIC | Age: 23
End: 2023-07-05

## 2023-08-10 ENCOUNTER — EMERGENCY (EMERGENCY)
Facility: HOSPITAL | Age: 23
LOS: 1 days | Discharge: HOME | End: 2023-08-10
Attending: STUDENT IN AN ORGANIZED HEALTH CARE EDUCATION/TRAINING PROGRAM
Payer: COMMERCIAL

## 2023-08-10 VITALS
WEIGHT: 191.8 LBS | DIASTOLIC BLOOD PRESSURE: 85 MMHG | RESPIRATION RATE: 16 BRPM | SYSTOLIC BLOOD PRESSURE: 134 MMHG | TEMPERATURE: 98 F | HEIGHT: 74 IN | HEART RATE: 94 BPM | OXYGEN SATURATION: 98 %

## 2023-08-10 PROCEDURE — 99283 EMERGENCY DEPT VISIT LOW MDM: CPT

## 2023-08-10 PROCEDURE — 99284 EMERGENCY DEPT VISIT MOD MDM: CPT

## 2023-08-10 RX ORDER — DIPHENHYDRAMINE HCL 50 MG
50 CAPSULE ORAL ONCE
Refills: 0 | Status: COMPLETED | OUTPATIENT
Start: 2023-08-10 | End: 2023-08-10

## 2023-08-10 RX ORDER — FAMOTIDINE 10 MG/ML
20 INJECTION INTRAVENOUS ONCE
Refills: 0 | Status: COMPLETED | OUTPATIENT
Start: 2023-08-10 | End: 2023-08-10

## 2023-08-10 RX ADMIN — FAMOTIDINE 20 MILLIGRAM(S): 10 INJECTION INTRAVENOUS at 22:53

## 2023-08-10 RX ADMIN — Medication 1 TABLET(S): at 22:53

## 2023-08-10 RX ADMIN — Medication 50 MILLIGRAM(S): at 22:53

## 2023-08-10 NOTE — ED ADULT NURSE NOTE - NSFALLUNIVINTERV_ED_ALL_ED
Bed/Stretcher in lowest position, wheels locked, appropriate side rails in place/Call bell, personal items and telephone in reach/Instruct patient to call for assistance before getting out of bed/chair/stretcher/Non-slip footwear applied when patient is off stretcher/Bowler to call system/Physically safe environment - no spills, clutter or unnecessary equipment/Purposeful proactive rounding/Room/bathroom lighting operational, light cord in reach

## 2023-08-10 NOTE — ED PROVIDER NOTE - CARE PLAN
1 Principal Discharge DX:	Bee sting   Principal Discharge DX:	Bee sting  Secondary Diagnosis:	Cellulitis of skin

## 2023-08-10 NOTE — ED ADULT TRIAGE NOTE - CHIEF COMPLAINT QUOTE
Pt. c/o multiple bee stings, top of right hand, right shoulder, right temple yesterday.  No reactions noted until today w/swelling of right hand... unable to make a fist, dizziness, fever 102 this am.  Tylenol taken.  Afebrile in triage.  No angioedema, no difficulty breathing.

## 2023-08-10 NOTE — ED ADULT NURSE NOTE - CAS DISCH TRANSFER METHOD
Zita Llamas is an 43 year old female.    Past Medical History   Diagnosis Date   • Acute bronchitis 12   • Anxiety    • Arthritis    • Back pain      DJD OF BACK    • Cyst of buttocks    • Esophageal reflux    • Fracture    • H. pylori infection    • Inflammatory bowel disease    • Kidney stone      damage to R kidney   • Liver disease    • Migraine    • Other chronic pain    • Sinusitis, chronic    • Ulcer    • Urinary tract infection      Past Surgical History   Procedure Laterality Date   • Gynecologic cryosurgery       ablation   • Foot tendon transfer     • Appendectomy     • Knee surgery     • Colonoscopy diagnostic  10/11/2011     [facility]-Dr. Terence Monteiro-Benign small unhealing ulcer, possible IBS, AV malformation of transverse colon, family h/o inflammatory or neoplastic bowel disease,hemorrhoids-Rpt pending pathology   •  section, classic     • Removal gallbladder     • Cystoscopy w/ stone manipulation       stent placed right kidney   • Tonsillectomy and adenoidectomy     • Insert picc line     • Service to gastroenterology     • Service to urology     • Breast biopsy     •  section, low transverse       Family History   Problem Relation Age of Onset   • High blood pressure Mother    • Heart disease Mother    • High cholesterol Mother    • Diabetes Father    • Heart disease Father    • Heart disease Sister      Social History   Substance Use Topics   • Smoking status: Current Every Day Smoker     Packs/day: 0.25     Years: 20.00     Types: Cigarettes   • Smokeless tobacco: Never Used      Comment: quit info packet previously given pack last 2-3 days   • Alcohol use No      Comment: rarely       Prior to Admission Meds:  Prescriptions Prior to Admission   Medication Sig Dispense Refill   • fentaNYL (DURAGESIC) 25 MCG/HR patch Place 1 patch onto the skin every 72 hours. Fill on or after 17 10 patch 0   • OxyCODONE HCl 10 MG immediate release tablet Take 1  tablet by mouth 4 times daily as needed (pain). 120 tablet 0   • venlafaxine XR (EFFEXOR XR) 150 MG 24 hr capsule Take 150 mg by mouth daily.     • divalproex (DEPAKOTE ER) 250 MG 24 hr ER tablet Take 250 mg by mouth daily.     • rOPINIRole (REQUIP) 0.5 MG tablet Take 1-2 tablets every night at bedtime 60 tablet 1   • sumatriptan (IMITREX) 50 MG tablet Take 50 mg by mouth as needed for Migraine. Indications: Migraine Headache Take 1 tablet by mouth at onset of migraine. May repeat after 2 hours if needed.     • Cariprazine HCl 3 MG capsule Take 3 mg by mouth daily.     • amphetamine-dextroamphetamine (ADDERALL) 20 MG tablet Take 20 mg by mouth daily. TAKE 2 TABLETS EVERY am     • clonazePAM (KLONOPIN) 1 MG tablet Take 1 mg by mouth 3 times daily as needed for Anxiety. Take 2 tablets 3 times per day as needed     • fluticasone (FLONASE) 50 MCG/ACT nasal spray Spray 2 sprays in each nostril daily. 16 g 0   • dicyclomine (BENTYL) 10 MG capsule Take 10 mg by mouth 4 times daily (before meals and nightly).     • gabapentin (NEURONTIN) 600 MG tablet Take 600 mg by mouth 3 times daily.     • tiZANidine (ZANAFLEX) 4 MG tablet Take 4 mg by mouth every 8 hours as needed (Muscle Spasms).     • omeprazole (PRILOSEC) 20 MG capsule Take 20 mg by mouth nightly.     • esomeprazole (NEXIUM) 40 MG capsule Take 40 mg by mouth daily.     • ondansetron (ZOFRAN ODT) 4 MG disintegrating tablet Take 1 tablet by mouth every 6 hours. 10 tablet 0   • naproxen (NAPROSYN) 500 MG tablet Take 1 tablet by mouth 2 times daily (with meals). 10 tablet 0   • fentaNYL (DURAGESIC) 50 MCG/HR patch Place 1 patch onto the skin every 72 hours. *DO NOT DISPENSE UNTIL ON OR AFTER 12/12/2016 10 patch 0   • HYDROcodone-acetaminophen (NORCO) 5-325 MG per tablet Take 1-2 tablets by mouth every 4 hours as needed for Pain. 12 tablet 0   • Lidocaine 0.5 % Gel Apply 0.5 application topically 3 times daily as needed (apply to affected area prn pain). 1 Tube 0   •  ondansetron (ZOFRAN ODT) 4 MG disintegrating tablet Take 1 tablet by mouth every 6 hours. 5 tablet 0     Scheduled Meds:  • sodium chloride (PF)  2 mL Injection 2 times per day     Continuous Infusions:  • sodium chloride 0.9% infusion       PRN Meds:sodium chloride (PF)    Allergies:   ALLERGIES:   Allergen Reactions   • Seafood ANAPHYLAXIS   • Betadine [Povidone Iodine]      Allergy to shellfish stays away from Iodine   • Ibuprofen [Motrin Ib] GI UPSET   • Iodine    • Nsaids        Active Problems:    * No active hospital problems. *    Blood pressure 113/64, pulse 71, temperature 97.4 °F (36.3 °C), temperature source Temporal Artery, resp. rate 18, height 5' 6.5\" (1.689 m), weight 72.4 kg, SpO2 98 %.    Review of Systems   Constitutional: Negative.    HENT: Negative.    Eyes: Negative.    Respiratory: Negative.    Cardiovascular: Negative.    Gastrointestinal: Negative.    Genitourinary: Negative.    Musculoskeletal: Positive for back pain.   Skin: Negative.    Neurological: Positive for tingling. Negative for sensory change, speech change and focal weakness.   Endo/Heme/Allergies: Negative.  Does not bruise/bleed easily.   Psychiatric/Behavioral: Negative for depression and suicidal ideas.       Physical Exam   Constitutional: She is oriented to person, place, and time. She appears well-developed and well-nourished. No distress.   HENT:   Head: Normocephalic and atraumatic.   Eyes: Pupils are equal, round, and reactive to light. No scleral icterus.   Neck: Neck supple. No thyromegaly present.   Abdominal: Soft. She exhibits no mass. There is no tenderness. There is no guarding.   Neurological: She is alert and oriented to person, place, and time. No cranial nerve deficit. She exhibits normal muscle tone.   Skin: No rash noted. She is not diaphoretic.   Psychiatric: She has a normal mood and affect. Her behavior is normal.       Assessment:  LUMBAR RADICULITIS    Plan:  LUMBAR CANDY     Joe Alvarez  MD  1/18/2017   Private car

## 2023-08-10 NOTE — ED PROVIDER NOTE - ATTENDING APP SHARED VISIT CONTRIBUTION OF CARE
23y.o male no PMh presents in [place] and insect bite got by bees yesterday x 10 Am now with left hand swelling will treat for mixed allergic / infectious presentation and have follow up outpt return precautions for worsening

## 2023-08-10 NOTE — ED ADULT NURSE NOTE - NS PRO PASSIVE SMOKE EXP
----- Message from Manju Elaine sent at 10/12/2022  3:10 PM CDT -----  Name of Who is Calling: ARIEL BENITES [[id number]]           What is the request in detail: Patient is requesting a call back to discuss x-ray results.  Please assist.           Can the clinic reply by [facility]: No           What Number to Call Back if not in [facility]: [phone]     Unknown

## 2023-08-10 NOTE — ED ADULT NURSE NOTE - OBJECTIVE STATEMENT
pt c/o itching, swelling s/p bee stings yesterday. denies difficulty breathing, sob, throat closing, cp,n,v,d,fever,chills. no significant pmh.

## 2023-08-10 NOTE — ED ADULT TRIAGE NOTE - TEMPERATURE IN FAHRENHEIT (DEGREES F)
Patient:  Zuhair Boothe Date:  2017   :  1948 Attending:  Gm Chu MD   69 year old female      Principal Diagnosis: Patient with history of GBM, on adjuvant chemoradiotherapy with temozolomide concurrent with radiotherapy admitted with fatigue, left-sided weakness. There was concern about aspiration pneumonia on 2017 patient was initiated on Zosyn and vancomycin, region swallow has been consulted. Patient has remained afebrile clinically stable.      Subjective:  Patient reports that she is feeling better today. She denies headaches, vision changes, left-sided weakness has improved since initiation of Decadron.     Past Medical, Surgical, Family and Social History has been reviewed and are unchanged.    Review of Systems:  All systems were reviewed including Constitutional, HEENT, Endocrine, Hematologic, Lymphatic, Respiratory, Cardiovascular, Gastrointestinal, Genitourinary, Musculoskeletal, Integumentary, Neurologic, Psychiatric and are negative other than as detailed in HPI (History of Present Illness) or above.     Objective:  Vitals: Blood pressure 122/58 mmHg, pulse of 84. Respirations of 18 per minute.    ECOG 4, pain scale 0.     General:  Appears stated age, no acute distress.  HEENT:  Pupils equally round, reactive to light with extraocular movements intact. Anicteric sclerae, no mucositis.   Neck:  Supple with no cervical or supraclavicular lymphadenopathy.  No JVD (jugular venous distension) or carotid bruit.  There is no thyromegaly.   Lungs:  Normal vesicular breathing, chest was clear to auscultation bilaterally.   Cardiovascular:  Regular rate and rhythm.  No S3, S4 or any murmurs.  There is no pericardial rub.   Abdomen:  Soft, nontender, no hepatosplenomegaly.  Bowel sounds positive.   Extremities:  No cyanosis, clubbing, or edema.  Peripheral pulses palpable.   Lymphatics:  There is no cervical, supraclavicular, axillary or inguinal lymphadenopathy.   Skin:  No  bruises or petechiae seen.   Neurologic:  Cranial nerves grossly intact.  Motor:  normal and symmetric muscle bulk and tone, diffuse hyporeflexia, there is weakness is 2/5 in left shoulder and elbow, 3/5 in right hip flexion, both knees, and 4/5 in the right elbow, left hand  and left ankle flexion. Full strength elsewhere in extremities, left toe is up-going, no ankle clonus    Medications:   • dexamethasone  4 mg Intravenous 3 times per day   • enoxaparin (LOVENOX) injection  40 mg Subcutaneous Q24H   • insulin lispro   Subcutaneous 4x Daily WC   • VANCOMYCIN - PHARMACIST MONITORED   Does not apply See Admin Instructions   • vancomycin (VANCOCIN) IVPB  1,250 mg Intravenous Daily   • piperacillin-tazobactam (ZOSYN) extended interval IVPB  3.375 g Intravenous 3 times per day   • albuterol-ipratropium 2.5 mg/0.5 mg  3 mL Nebulization BID Resp   • amLODIPine  10 mg Oral Daily   • aspirin  81 mg Oral Daily   • fluticasone-vilanterol  1 puff Inhalation Daily Resp   • buPROPion  150 mg Oral Daily   • calcium carbonate-vitamin D  1 tablet Oral BID WC   • levETIRAcetam  1,000 mg Oral 2 times per day   • montelukast  10 mg Oral Q Evening   • ondansetron  8 mg Oral Daily   • pantoprazole  40 mg Oral Daily   • temozolomide  140 mg Oral Daily   • sodium chloride (PF)  2 mL Injection 2 times per day   • sodium chloride (PF)  2 mL Injection 2 times per day       • dextrose 5 % infusion         Allergies:  ALLERGIES:   Allergen Reactions   • Dristan [Chlorpheniramine-Phenylephrine] SWELLING     THROAT SWELLING   • Latex HIVES   • Wool Alcohol [Lanolin] HIVES   • Phenothiazines HIVES     PHENERGAN   • Cymbalta [Duloxetine Hcl] PRURITUS       Laboratory Results:    Recent Labs  Lab 09/22/17  0549 09/21/17  0532 09/20/17  0552   WBC 3.7* 4.0* 4.0*   RBC 2.96* 2.96* 3.18*   HCT 26.8* 27.0* 29.5*   HGB 8.9* 8.9* 9.6*    211 170       Recent Labs  Lab 09/22/17  0549  09/19/17  0603 09/18/17  0530   SODIUM 137  < > 138 139    POTASSIUM 3.9  < > 4.4 4.9   CHLORIDE 102  < > 106 107   CO2 23  < > 24 21   GLUCOSE 179*  < > 100* 131*   BUN 19  < > 15 29*   CREATININE 1.11*  < > 1.00* 1.19*   CALCIUM 8.6  < > 8.3* 8.4   ALBUMIN  --   --  2.3* 2.5*   MG  --   --   --  1.6*   BILIRUBIN  --   --  1.5* 1.5*   ALKPT  --   --  51 45   AST  --   --  16 18   GPT  --   --  25 28   < > = values in this interval not displayed.      ASSESSMENT:  #1 glioblastoma multi-forming status post resection.  #2 on concurrent chemoradiotherapy with temozolomide concurrent with radiotherapy.  #3 cancer fatigue grade 3.  #4 deconditioning/adult failure to thrive.  #5 ECOG 4.  #6 left-sided weakness, MRI brain showing possible residual GBM with local mass effect 9/18/2017. Symptoms are improving since initiation of oral Decadron therapy.  #7 possible upper respiratory infection, concern for aspiration.     PLAN:  #1 previously discussed patient's clinical course with patient and family in detail.  #2 given current performance status, grade 3 cancer fatigue, hold temozolomide at this time. Continue dexamethasone 4 mg p.o. q.6 hours. To be tapered in the near future.  #3 recommend physical therapy occupational therapy evaluation.  #4 continue Keppra for seizure prophylaxis, given lymphopenia from temozolomide chemotherapy recommend prophylactic antibiotic with Bactrim DS Monday Wednesday Friday.  #5 please consider radiation oncology evaluation.  #6  management of upper respiratory infection, concern for aspiration as per the hospitalist service.  #7 oncology consult service will continue to follow the patient while inpatient. Please call with questions.        Renetta Faulkner MD      Plan of care was discussed with the patient and hospitalist service.           Thank you for letting me participate in the patient's care.       98.5

## 2023-08-10 NOTE — ED PROVIDER NOTE - NSFOLLOWUPINSTRUCTIONS_ED_ALL_ED_FT
take medication as prescribed   call and follow up with primary care within 2-3 days   Bee, Wasp, or Hornet Sting, Adult    Bees, wasps, and hornets are part of a family of insects that can sting people. These stings can cause pain and inflammation, but they are usually not serious. However, some people may have an allergic reaction to a sting. This can cause the symptoms to be more severe.    What increases the risk?  You may be at a greater risk of getting stung if you:    Provoke a stinging insect by swatting or disturbing it.  Wear strong-smelling soaps, deodorants, or body sprays.  Spend time outdoors near gardens with flowers or fruit trees or in clothes that expose skin.  Eat or drink outside.    What are the signs or symptoms?  Common symptoms of this condition include:    A red lump in the skin that sometimes has a tiny hole in the center. In some cases, a stinger may be in the center of the wound.  Pain and itching at the sting site.  Redness and swelling around the sting site. If you have an allergic reaction (localized allergic reaction), the swelling and redness may spread out from the sting site. In some cases, this reaction can continue to develop over the next 24–48 hours.    In rare cases, a person may have a severe allergic reaction (anaphylactic reaction) to a sting. Symptoms of an anaphylactic reaction may include:    Wheezing or difficulty breathing.  Raised, itchy, red patches on the skin (hives).  Nausea or vomiting.  Abdominal cramping.  Diarrhea.  Tightness in the chest or chest pain.  Dizziness or fainting.  Redness of the face (flushing).  Hoarse voice.  Swollen tongue, lips, or face.    How is this diagnosed?  This condition is usually diagnosed based on your symptoms and medical history as well as a physical exam. You may have an allergy test to determine if you are allergic to the substance that the insect injected during the sting (venom).    How is this treated?  If you were stung by a bee, the stinger and a small sac of venom may be in the wound. It is important to remove the stinger as soon as possible. You can do this by brushing across the wound with gauze, a fingernail, or a flat card such as a credit card. Removing the stinger can help reduce the severity of your body’s reaction to the sting.    Most stings can be treated with:    Icing to reduce swelling in the area.  Medicines (antihistamines) to treat itching or an allergic reaction.  Medicines to help reduce pain. These may be medicines that you take by mouth, or medicated creams or lotions that you apply to your skin.    Pay close attention to your symptoms after you have been stung. If possible, have someone stay with you to make sure you do not have an allergic reaction. If you have any signs of an allergic reaction, call your health care provider. If you have ever had a severe allergic reaction, your health care provider may give you an inhaler or injectable medicine (epinephrine auto-injector) to use if necessary.    Follow these instructions at home:     Wash the sting site 2–3 times each day with soap and water as told by your health care provider.  Apply or take over-the-counter and prescription medicines only as told by your health care provider.  If directed, apply ice to the sting area.    Put ice in a plastic bag.  Place a towel between your skin and the bag.  Leave the ice on for 20 minutes, 2–3 times a day.  Do not scratch the sting area.  If you had a severe allergic reaction to a sting, you may need:    To wear a medical bracelet or necklace that lists the allergy.  To learn when and how to use an anaphylaxis kit or epinephrine injection. Your family members and coworkers may also need to learn this.  To carry an anaphylaxis kit or epinephrine injection with you at all times.    How is this prevented?  Avoid swatting at stinging insects and disturbing insect nests.  Do not use fragrant soaps or lotions.  Wear shoes, pants, and long sleeves when spending time outdoors, especially in grassy areas where stinging insects are common.  Keep outdoor areas free from nests or hives.  Keep food and drink containers covered when eating outdoors.  Avoid working or sitting near flowering plants, if possible.  Wear gloves if you are gardening or working outdoors.  If an attack by a stinging insect or a swarm seems likely in the moment, move away from the area or find a barrier between you and the insect(s), such as a door.    Contact a health care provider if:  Your symptoms do not get better in 2–3 days.  You have redness, swelling, or pain that spreads beyond the area of the sting.  You have a fever.    Get help right away if:  You have symptoms of a severe allergic reaction. These include:    Wheezing or difficulty breathing.  Tightness in the chest or chest pain.  Light-headedness or fainting.  Itchy, raised, red patches on the skin.  Nausea or vomiting.  Abdominal cramping.  Diarrhea.  A swollen tongue or lips, or trouble swallowing.  Dizziness or fainting.    Summary  Stings from bees, wasps, and hornets can cause pain and inflammation, but they are usually not serious. However, some people may have an allergic reaction to a sting. This can cause the symptoms to be more severe.  Pay close attention to your symptoms after you have been stung. If possible, have someone stay with you to make sure you do not have an allergic reaction.  Call your health care provider if you have any signs of an allergic reaction.    ADDITIONAL NOTES AND INSTRUCTIONS    Please follow up with your Primary MD in 24-48 hr.  Seek immediate medical care for any new/worsening signs or symptoms.

## 2023-08-10 NOTE — ED PROVIDER NOTE - PRO INTERPRETER NEED 2
English
Patient instructed to take metoprolol  with a sip of water on the morning of procedure. Patient verbalized understanding.

## 2023-08-10 NOTE — ED PROVIDER NOTE - CLINICAL SUMMARY MEDICAL DECISION MAKING FREE TEXT BOX
23y.o male no PMh presents in [place] and insect bite got by bees yesterday x 10 Am . as he was working out side. states he got sung at the right hand and arm and left side of the face and R- abd. he denies any allergy reaction . he had fever of 101.2last night took tylenol . he took the OTC decongestant that did not helped. denies any throat closing - lips swollen and SOB . he noticed the left hand swollen today at the area swollen of the hand  benadryl 50mg - pepcid 20 [id number] and Augmentin  to tx the cellulitis

## 2023-08-10 NOTE — ED PROVIDER NOTE - SKIN, MLM
multiple bee stung bite on lUE : left hand- mild swollen over dorsum . left arm with erythema and swollen noted and warm to touch- 2 bite side of the left side of the  face and RUQ noted not TTP no spread erythema noted

## 2023-08-10 NOTE — ED ADULT NURSE NOTE - FINAL NURSING ELECTRONIC SIGNATURE
Pt presents for fever and headache onset 1700 today. States that he developed a headache with the onset of the fever. Pt states that he took tylenol at home but is unsure what time. Pt's wife states patient received tylenol 650mg at 1800.  
11-Aug-2023 00:06

## 2023-08-10 NOTE — ED PROVIDER NOTE - PATIENT PORTAL LINK FT
You can access the FollowMyHealth Patient Portal offered by [facility] by registering at the following website: [email or web address]. By joining [employer]’s FollowMyHealth portal, you will also be able to view your health information using other applications (apps) compatible with our system.

## 2023-08-10 NOTE — ED PROVIDER NOTE - OBJECTIVE STATEMENT
23y.o male no PMh presents in [place] and insect bite got by bees yesterday x 10 Am . as he was working out side. states he got sung at the right hand and arm and left side of the face and R- abd. he denies any allergy reaction . he had fever of 101.2last night took tylenol . he took the OTC decongestant that did not helped. denies any throat closing - lips swollen and SOB . he noticed the left hand swollen today at the area swollen of the hand

## 2023-08-11 RX ORDER — DIPHENHYDRAMINE HCL 50 MG
1 CAPSULE ORAL
Qty: 20 | Refills: 0
Start: 2023-08-11 | End: 2023-08-15

## 2023-08-11 RX ORDER — FAMOTIDINE 10 MG/ML
1 INJECTION INTRAVENOUS
Qty: 14 | Refills: 0
Start: 2023-08-11 | End: 2023-08-17

## 2023-12-04 ENCOUNTER — EMERGENCY (EMERGENCY)
Facility: HOSPITAL | Age: 23
LOS: 1 days | Discharge: HOME | End: 2023-12-04
Attending: EMERGENCY MEDICINE
Payer: COMMERCIAL

## 2023-12-04 VITALS
RESPIRATION RATE: 20 BRPM | DIASTOLIC BLOOD PRESSURE: 76 MMHG | HEART RATE: 99 BPM | OXYGEN SATURATION: 97 % | TEMPERATURE: 99 F | HEIGHT: 75 IN | WEIGHT: 202.38 LBS | SYSTOLIC BLOOD PRESSURE: 118 MMHG

## 2023-12-04 LAB
ALBUMIN SERPL ELPH-MCNC: 4.4 G/DL — SIGNIFICANT CHANGE UP (ref 3.3–5.2)
ALBUMIN SERPL ELPH-MCNC: 4.4 G/DL — SIGNIFICANT CHANGE UP (ref 3.3–5.2)
ALP SERPL-CCNC: 87 U/L — SIGNIFICANT CHANGE UP (ref 40–120)
ALP SERPL-CCNC: 87 U/L — SIGNIFICANT CHANGE UP (ref 40–120)
ALT FLD-CCNC: 27 U/L — SIGNIFICANT CHANGE UP
ALT FLD-CCNC: 27 U/L — SIGNIFICANT CHANGE UP
ANION GAP SERPL CALC-SCNC: 15 MMOL/L — SIGNIFICANT CHANGE UP (ref 5–17)
ANION GAP SERPL CALC-SCNC: 15 MMOL/L — SIGNIFICANT CHANGE UP (ref 5–17)
AST SERPL-CCNC: 19 U/L — SIGNIFICANT CHANGE UP
AST SERPL-CCNC: 19 U/L — SIGNIFICANT CHANGE UP
BASOPHILS # BLD AUTO: 0.03 K/UL — SIGNIFICANT CHANGE UP (ref 0–0.2)
BASOPHILS # BLD AUTO: 0.03 K/UL — SIGNIFICANT CHANGE UP (ref 0–0.2)
BASOPHILS NFR BLD AUTO: 0.3 % — SIGNIFICANT CHANGE UP (ref 0–2)
BASOPHILS NFR BLD AUTO: 0.3 % — SIGNIFICANT CHANGE UP (ref 0–2)
BILIRUB SERPL-MCNC: 0.7 MG/DL — SIGNIFICANT CHANGE UP (ref 0.4–2)
BILIRUB SERPL-MCNC: 0.7 MG/DL — SIGNIFICANT CHANGE UP (ref 0.4–2)
BUN SERPL-MCNC: 13.7 MG/DL — SIGNIFICANT CHANGE UP (ref 8–20)
BUN SERPL-MCNC: 13.7 MG/DL — SIGNIFICANT CHANGE UP (ref 8–20)
CALCIUM SERPL-MCNC: 8.8 MG/DL — SIGNIFICANT CHANGE UP (ref 8.4–10.5)
CALCIUM SERPL-MCNC: 8.8 MG/DL — SIGNIFICANT CHANGE UP (ref 8.4–10.5)
CHLORIDE SERPL-SCNC: 102 MMOL/L — SIGNIFICANT CHANGE UP (ref 96–108)
CHLORIDE SERPL-SCNC: 102 MMOL/L — SIGNIFICANT CHANGE UP (ref 96–108)
CO2 SERPL-SCNC: 22 MMOL/L — SIGNIFICANT CHANGE UP (ref 22–29)
CO2 SERPL-SCNC: 22 MMOL/L — SIGNIFICANT CHANGE UP (ref 22–29)
CREAT SERPL-MCNC: 0.86 MG/DL — SIGNIFICANT CHANGE UP (ref 0.5–1.3)
CREAT SERPL-MCNC: 0.86 MG/DL — SIGNIFICANT CHANGE UP (ref 0.5–1.3)
EGFR: 125 ML/MIN/1.73M2 — SIGNIFICANT CHANGE UP
EGFR: 125 ML/MIN/1.73M2 — SIGNIFICANT CHANGE UP
EOSINOPHIL # BLD AUTO: 0.24 K/UL — SIGNIFICANT CHANGE UP (ref 0–0.5)
EOSINOPHIL # BLD AUTO: 0.24 K/UL — SIGNIFICANT CHANGE UP (ref 0–0.5)
EOSINOPHIL NFR BLD AUTO: 2.1 % — SIGNIFICANT CHANGE UP (ref 0–6)
EOSINOPHIL NFR BLD AUTO: 2.1 % — SIGNIFICANT CHANGE UP (ref 0–6)
GLUCOSE SERPL-MCNC: 112 MG/DL — HIGH (ref 70–99)
GLUCOSE SERPL-MCNC: 112 MG/DL — HIGH (ref 70–99)
HCT VFR BLD CALC: 46.7 % — SIGNIFICANT CHANGE UP (ref 39–50)
HCT VFR BLD CALC: 46.7 % — SIGNIFICANT CHANGE UP (ref 39–50)
HGB BLD-MCNC: 16.3 G/DL — SIGNIFICANT CHANGE UP (ref 13–17)
HGB BLD-MCNC: 16.3 G/DL — SIGNIFICANT CHANGE UP (ref 13–17)
IMM GRANULOCYTES NFR BLD AUTO: 0.4 % — SIGNIFICANT CHANGE UP (ref 0–0.9)
IMM GRANULOCYTES NFR BLD AUTO: 0.4 % — SIGNIFICANT CHANGE UP (ref 0–0.9)
LIDOCAIN IGE QN: 28 U/L — SIGNIFICANT CHANGE UP (ref 22–51)
LIDOCAIN IGE QN: 28 U/L — SIGNIFICANT CHANGE UP (ref 22–51)
LYMPHOCYTES # BLD AUTO: 0.76 K/UL — LOW (ref 1–3.3)
LYMPHOCYTES # BLD AUTO: 0.76 K/UL — LOW (ref 1–3.3)
LYMPHOCYTES # BLD AUTO: 6.7 % — LOW (ref 13–44)
LYMPHOCYTES # BLD AUTO: 6.7 % — LOW (ref 13–44)
MCHC RBC-ENTMCNC: 28.8 PG — SIGNIFICANT CHANGE UP (ref 27–34)
MCHC RBC-ENTMCNC: 28.8 PG — SIGNIFICANT CHANGE UP (ref 27–34)
MCHC RBC-ENTMCNC: 34.9 GM/DL — SIGNIFICANT CHANGE UP (ref 32–36)
MCHC RBC-ENTMCNC: 34.9 GM/DL — SIGNIFICANT CHANGE UP (ref 32–36)
MCV RBC AUTO: 82.5 FL — SIGNIFICANT CHANGE UP (ref 80–100)
MCV RBC AUTO: 82.5 FL — SIGNIFICANT CHANGE UP (ref 80–100)
MONOCYTES # BLD AUTO: 0.88 K/UL — SIGNIFICANT CHANGE UP (ref 0–0.9)
MONOCYTES # BLD AUTO: 0.88 K/UL — SIGNIFICANT CHANGE UP (ref 0–0.9)
MONOCYTES NFR BLD AUTO: 7.7 % — SIGNIFICANT CHANGE UP (ref 2–14)
MONOCYTES NFR BLD AUTO: 7.7 % — SIGNIFICANT CHANGE UP (ref 2–14)
NEUTROPHILS # BLD AUTO: 9.44 K/UL — HIGH (ref 1.8–7.4)
NEUTROPHILS # BLD AUTO: 9.44 K/UL — HIGH (ref 1.8–7.4)
NEUTROPHILS NFR BLD AUTO: 82.8 % — HIGH (ref 43–77)
NEUTROPHILS NFR BLD AUTO: 82.8 % — HIGH (ref 43–77)
PLATELET # BLD AUTO: 284 K/UL — SIGNIFICANT CHANGE UP (ref 150–400)
PLATELET # BLD AUTO: 284 K/UL — SIGNIFICANT CHANGE UP (ref 150–400)
POTASSIUM SERPL-MCNC: 3.9 MMOL/L — SIGNIFICANT CHANGE UP (ref 3.5–5.3)
POTASSIUM SERPL-MCNC: 3.9 MMOL/L — SIGNIFICANT CHANGE UP (ref 3.5–5.3)
POTASSIUM SERPL-SCNC: 3.9 MMOL/L — SIGNIFICANT CHANGE UP (ref 3.5–5.3)
POTASSIUM SERPL-SCNC: 3.9 MMOL/L — SIGNIFICANT CHANGE UP (ref 3.5–5.3)
PROT SERPL-MCNC: 7.8 G/DL — SIGNIFICANT CHANGE UP (ref 6.6–8.7)
PROT SERPL-MCNC: 7.8 G/DL — SIGNIFICANT CHANGE UP (ref 6.6–8.7)
RBC # BLD: 5.66 M/UL — SIGNIFICANT CHANGE UP (ref 4.2–5.8)
RBC # BLD: 5.66 M/UL — SIGNIFICANT CHANGE UP (ref 4.2–5.8)
RBC # FLD: 12.6 % — SIGNIFICANT CHANGE UP (ref 10.3–14.5)
RBC # FLD: 12.6 % — SIGNIFICANT CHANGE UP (ref 10.3–14.5)
SODIUM SERPL-SCNC: 139 MMOL/L — SIGNIFICANT CHANGE UP (ref 135–145)
SODIUM SERPL-SCNC: 139 MMOL/L — SIGNIFICANT CHANGE UP (ref 135–145)
WBC # BLD: 11.4 K/UL — HIGH (ref 3.8–10.5)
WBC # BLD: 11.4 K/UL — HIGH (ref 3.8–10.5)
WBC # FLD AUTO: 11.4 K/UL — HIGH (ref 3.8–10.5)
WBC # FLD AUTO: 11.4 K/UL — HIGH (ref 3.8–10.5)

## 2023-12-04 PROCEDURE — 99284 EMERGENCY DEPT VISIT MOD MDM: CPT | Mod: 25

## 2023-12-04 PROCEDURE — 96374 THER/PROPH/DIAG INJ IV PUSH: CPT

## 2023-12-04 PROCEDURE — 99284 EMERGENCY DEPT VISIT MOD MDM: CPT

## 2023-12-04 PROCEDURE — 36415 COLL VENOUS BLD VENIPUNCTURE: CPT

## 2023-12-04 PROCEDURE — 80053 COMPREHEN METABOLIC PANEL: CPT

## 2023-12-04 PROCEDURE — 85025 COMPLETE CBC W/AUTO DIFF WBC: CPT

## 2023-12-04 PROCEDURE — 83690 ASSAY OF LIPASE: CPT

## 2023-12-04 PROCEDURE — 96375 TX/PRO/DX INJ NEW DRUG ADDON: CPT

## 2023-12-04 RX ORDER — ONDANSETRON 8 MG/1
4 TABLET, FILM COATED ORAL ONCE
Refills: 0 | Status: COMPLETED | OUTPATIENT
Start: 2023-12-04 | End: 2023-12-04

## 2023-12-04 RX ORDER — SODIUM CHLORIDE 9 MG/ML
1000 INJECTION INTRAMUSCULAR; INTRAVENOUS; SUBCUTANEOUS ONCE
Refills: 0 | Status: COMPLETED | OUTPATIENT
Start: 2023-12-04 | End: 2023-12-04

## 2023-12-04 RX ORDER — ONDANSETRON 8 MG/1
1 TABLET, FILM COATED ORAL
Qty: 9 | Refills: 0
Start: 2023-12-04 | End: 2023-12-06

## 2023-12-04 RX ORDER — FAMOTIDINE 10 MG/ML
20 INJECTION INTRAVENOUS ONCE
Refills: 0 | Status: COMPLETED | OUTPATIENT
Start: 2023-12-04 | End: 2023-12-04

## 2023-12-04 RX ORDER — ACETAMINOPHEN 500 MG
1000 TABLET ORAL ONCE
Refills: 0 | Status: COMPLETED | OUTPATIENT
Start: 2023-12-04 | End: 2023-12-04

## 2023-12-04 RX ADMIN — FAMOTIDINE 20 MILLIGRAM(S): 10 INJECTION INTRAVENOUS at 05:54

## 2023-12-04 RX ADMIN — SODIUM CHLORIDE 1000 MILLILITER(S): 9 INJECTION INTRAMUSCULAR; INTRAVENOUS; SUBCUTANEOUS at 05:58

## 2023-12-04 RX ADMIN — ONDANSETRON 4 MILLIGRAM(S): 8 TABLET, FILM COATED ORAL at 05:54

## 2023-12-04 RX ADMIN — Medication 400 MILLIGRAM(S): at 05:57

## 2023-12-04 NOTE — ED ADULT NURSE NOTE - OBJECTIVE STATEMENT
pt states starting last night he developed abd pain that has gotten worse this morning. pt states he wasn't able to sleep last night d/t pain. pt endorses multiple episodes of emesis this morning. pt given emesis bag. respirations even and unlabored. mother at bedside

## 2023-12-04 NOTE — ED PROVIDER NOTE - OBJECTIVE STATEMENT
24 yo male PMHx appendectomy presents to ED c/o abdominal pain. Pain began at 8pm last night followed by vomiting. Medicated with [facility] [place]. No further complaints at this time.   Denies gallstones, fevers, diarrhea. 24 yo male PMHx appendectomy presents to ED c/o abdominal pain. Pain began at 8pm last night followed by vomiting. Medicated with [facility] [place]. No further complaints at this time.   Denies gallstones, fevers, diarrhea. 24 yo male PMHx appendectomy presents to ED c/o abdominal pain. Pain mid upper. Pain began at 8pm last night followed by vomiting. Medicated with [facility] [place]. No further complaints at this time.   Denies gallstones, fevers, diarrhea. 24 yo male PMHx appendectomy presents to ED c/o abdominal pain. Pain mid upper. Pain began at 8pm last night followed by vomiting. Medicated with [facility] [place]. No further complaints at this time.   Denies gallstones, fevers, diarrhea.

## 2023-12-04 NOTE — ED ADULT TRIAGE NOTE - CHIEF COMPLAINT QUOTE
pt arrived to walk in triage with abdominal pain with n/v that started last night around 8pm. endorses appendix removal years ago

## 2023-12-04 NOTE — ED PROVIDER NOTE - PATIENT PORTAL LINK FT
You can access the FollowMyHealth Patient Portal offered by [facility] by registering at the following website: [email or web address]. By joining [employer]’s FollowMyHealth portal, you will also be able to view your health information using other applications (apps) compatible with our system. You can access the FollowMyHealth Patient Portal offered by [facility] by registering at the following website: [email or web address]. By joining [employer]’s FollowMyHealth portal, you will also be able to view your health information using other applications (apps) compatible with our system.

## 2023-12-04 NOTE — ED ADULT NURSE NOTE - NSFALLUNIVINTERV_ED_ALL_ED
Bed/Stretcher in lowest position, wheels locked, appropriate side rails in place/Call bell, personal items and telephone in reach/Instruct patient to call for assistance before getting out of bed/chair/stretcher/Non-slip footwear applied when patient is off stretcher/[place] to call system/Physically safe environment - no spills, clutter or unnecessary equipment/Purposeful proactive rounding/Room/bathroom lighting operational, light cord in reach Bed/Stretcher in lowest position, wheels locked, appropriate side rails in place/Call bell, personal items and telephone in reach/Instruct patient to call for assistance before getting out of bed/chair/stretcher/Non-slip footwear applied when patient is off stretcher/[place] to call system/Physically safe environment - no spills, clutter or unnecessary equipment/Purposeful proactive rounding/Room/bathroom lighting operational, light cord in reach

## 2023-12-04 NOTE — ED PROVIDER NOTE - PHYSICAL EXAMINATION
Gen: Nontoxic, well appearing, in NAD.  Skin: Warm and dry as visualized.  Head: NC/AT.  Eyes: PERRLA. EOMI.  Neck: Supple, FROM. Trachea midline.   Resp: No distress.  Cardio: Well perfused.  Abd: Nondistended. Soft, +epigastric tenderness. Negative Luis's sign. No guarding.   Ext: No deformities. MAEx4. FROM.   Neuro: A&Ox3. Appears nonfocal.   Psych: Normal affect and mood. Gen: Nontoxic, well appearing, in NAD.  Skin: Warm and dry as visualized.  Head: NC/AT.  Eyes: PERRLA. EOMI.  Neck: Supple, FROM. Trachea midline.   Resp: No distress.  Cardio: Well perfused.  Abd: Nondistended. Soft, b/l upper abdominal tenderness greatest at epigastrium. Negative Luis's sign. No guarding.   Ext: No deformities. MAEx4. FROM.   Neuro: A&Ox3. Appears nonfocal.   Psych: Normal affect and mood.

## 2023-12-04 NOTE — ED PROVIDER NOTE - NSFOLLOWUPINSTRUCTIONS_ED_ALL_ED_FT
- Prescription sent to pharmacy.  - Increase fluids.  - [place] diet as tolerated. Avoid citrus based food/drink, spicy/greasy foods, milk, caffeine.   - Please call to schedule follow up appointment with your primary care physician within 24-48 hours.  - Please seek immediate medical attention for any new/worsening, signs/symptoms, or concerns.    Feel better!      Acute Nausea and Vomiting    WHAT YOU NEED TO KNOW:    What is acute nausea and vomiting? Acute nausea and vomiting starts suddenly, gets worse quickly, and lasts a short time.    What are some common causes of acute nausea and vomiting?   •Food poisoning      •Large amounts of alcohol      •Certain medicines, too much of any medicine, or stopping a regular medicine too quickly      •Early stages of pregnancy      •Infection in the stomach, intestines, or other organs      •Trauma to the head      •Anxiety or stress      •Gastroparesis (a condition that prevents your stomach from emptying properly)      •Metabolic disorders, such as uremia or adrenal insufficiency      What causes acute nausea and vomiting with stomach pain?  •Inflammation of the appendix, gallbladder, stomach, pancreas, kidneys, or other organs      •Gallstones      •Bacteria or a parasite in the digestive system      •Heart attack      •Stomach ulcers, or bowel blockage or twisting      What causes acute nausea and vomiting with other signs and symptoms? You may be sweating and have pale skin, problems with digestion, and more saliva than usual. These signs and symptoms may be caused by the following:  •Problems with your heart rate, blood flow to your heart muscle, blood pressure, or stomach fluid      •Increased pressure or bleeding in the brain      •Swelling of the tissue covering the brain      •Migraine or seizures      •Inner ear disorders that cause problems with balance      How is the cause of acute nausea and vomiting diagnosed? Your healthcare provider will examine you and ask about your medical history. Tell your provider about other signs and symptoms you have. Also tell your provider when you last had nausea and vomiting and how long it continued. Include if it happened before, during, or after a meal, and how much you ate. Your provider will need to know how much came out when you vomited, and if it was fast and forceful. Tell your provider if your vomit smelled like bowel movement or had blood or food in it. Tell your provider if the vomit was bright yellow. You may need any of the following:   •Blood tests may be used to check for infection or inflammation.       •X-ray, CT, or MRI pictures may be used to find an injury or blockage.      How may acute nausea and vomiting be treated? The first goal of treatment for nausea and vomiting is to prevent or treat dehydration. Treatment also depends on the cause of the nausea and vomiting. Any medical condition causing your nausea and vomiting will also be treated. Treatment is also aimed at stopping or preventing your signs and symptoms. You may need one or more of the following:  •Medicines may be given to calm your stomach and stop your vomiting. You may also need medicines to help you feel more relaxed or to stop nausea and vomiting caused by motion sickness. Gastrointestinal stimulants may be used to help empty your stomach and bowels. This can help decrease your nausea and vomiting.      •IV fluids may be given to replace lost fluids and electrolytes. This may be needed it you cannot drink liquids.      •Nasogastric (NG) tube: An NG tube is put into your nose, and passes down your throat until it reaches your stomach. Food and medicine may be given through an NG tube if you cannot take anything by mouth. The tube may instead be attached to suction if healthcare providers need to keep your stomach empty.       What can I do to prevent or manage acute nausea and vomiting?   •Do not drink alcohol. Alcohol may upset or irritate your stomach. Too much alcohol can also cause acute nausea and vomiting.      •Control stress. Headaches due to stress may cause nausea and vomiting. Find ways to relax and manage your stress. Get more rest and sleep.      •Drink more liquids as directed. Vomiting can lead to dehydration. It is important to drink more liquids to help replace lost body fluids. Ask your healthcare provider how much liquid to drink each day and which liquids are best for you. Your provider may recommend that you drink an oral rehydration solution (ORS). ORS contains water, salts, and sugar that are needed to replace the lost body fluids. Ask what kind of ORS to use, how much to drink, and where to get it.      •Eat smaller meals, more often. Eat small amounts of food every 2 to 3 hours, even if you are not hungry. Food in your stomach may decrease your nausea.      •Talk to your healthcare provider before you take over-the-counter (OTC) medicines. These medicines can cause serious problems if you use certain other medicines, or you have a medical condition. You may have problems if you use too much or use them for longer than the label says. Follow directions on the label carefully.       When should I seek immediate care?   •You see blood in your vomit or your bowel movements.      •You have sudden, severe pain in your chest and upper abdomen after hard vomiting or retching.      •You have swelling in your neck and chest.       •You are dizzy, cold, and thirsty, and your eyes and mouth are dry.      •You are urinating very little or not at all.      •You have muscle weakness, leg cramps, and trouble breathing.      •Your heart is beating much faster than normal.      •You continue to vomit for more than 48 hours.       When should I contact my healthcare provider?   •You have frequent dry heaves (vomiting but nothing comes out).      •Your nausea and vomiting does not get better or go away after you use medicine.      •You have questions or concerns about your condition or care.      CARE AGREEMENT:    You have the right to help plan your care. Learn about your health condition and how it may be treated. Discuss treatment options with your healthcare providers to decide what care you want to receive. You always have the right to refuse treatment. - Prescription sent to pharmacy.  - Increase fluids.  - [place] diet as tolerated. Avoid citrus based food/drink, spicy/greasy foods, milk, caffeine.   - Please call to schedule follow up appointment with your primary care physician within 24-48 hours.  - Please seek immediate medical attention for any new/worsening, signs/symptoms, or concerns.    Feel better!      Acute Nausea and Vomiting    WHAT YOU NEED TO KNOW:    What is acute nausea and vomiting? Acute nausea and vomiting starts suddenly, gets worse quickly, and lasts a short time.    What are some common causes of acute nausea and vomiting?   •Food poisoning      •Large amounts of alcohol      •Certain medicines, too much of any medicine, or stopping a regular medicine too quickly      •Early stages of pregnancy      •Infection in the stomach, intestines, or other organs      •Trauma to the head      •Anxiety or stress      •Gastroparesis (a condition that prevents your stomach from emptying properly)      •Metabolic disorders, such as uremia or adrenal insufficiency      What causes acute nausea and vomiting with stomach pain?  •Inflammation of the appendix, gallbladder, stomach, pancreas, kidneys, or other organs      •Gallstones      •Bacteria or a parasite in the digestive system      •Heart attack      •Stomach ulcers, or bowel blockage or twisting      What causes acute nausea and vomiting with other signs and symptoms? You may be sweating and have pale skin, problems with digestion, and more saliva than usual. These signs and symptoms may be caused by the following:  •Problems with your heart rate, blood flow to your heart muscle, blood pressure, or stomach fluid      •Increased pressure or bleeding in the brain      •Swelling of the tissue covering the brain      •Migraine or seizures      •Inner ear disorders that cause problems with balance      How is the cause of acute nausea and vomiting diagnosed? Your healthcare provider will examine you and ask about your medical history. Tell your provider about other signs and symptoms you have. Also tell your provider when you last had nausea and vomiting and how long it continued. Include if it happened before, during, or after a meal, and how much you ate. Your provider will need to know how much came out when you vomited, and if it was fast and forceful. Tell your provider if your vomit smelled like bowel movement or had blood or food in it. Tell your provider if the vomit was bright yellow. You may need any of the following:   •Blood tests may be used to check for infection or inflammation.       •X-ray, CT, or MRI pictures may be used to find an injury or blockage.      How may acute nausea and vomiting be treated? The first goal of treatment for nausea and vomiting is to prevent or treat dehydration. Treatment also depends on the cause of the nausea and vomiting. Any medical condition causing your nausea and vomiting will also be treated. Treatment is also aimed at stopping or preventing your signs and symptoms. You may need one or more of the following:  •Medicines may be given to calm your stomach and stop your vomiting. You may also need medicines to help you feel more relaxed or to stop nausea and vomiting caused by motion sickness. Gastrointestinal stimulants may be used to help empty your stomach and bowels. This can help decrease your nausea and vomiting.      •IV fluids may be given to replace lost fluids and electrolytes. This may be needed it you cannot drink liquids.      •Nasogastric (NG) tube: An NG tube is put into your nose, and passes down your throat until it reaches your stomach. Food and medicine may be given through an NG tube if you cannot take anything by mouth. The tube may instead be attached to suction if healthcare providers need to keep your stomach empty.       What can I do to prevent or manage acute nausea and vomiting?   •Do not drink alcohol. Alcohol may upset or irritate your stomach. Too much alcohol can also cause acute nausea and vomiting.      •Control stress. Headaches due to stress may cause nausea and vomiting. Find ways to relax and manage your stress. Get more rest and sleep.      •Drink more liquids as directed. Vomiting can lead to dehydration. It is important to drink more liquids to help replace lost body fluids. Ask your healthcare provider how much liquid to drink each day and which liquids are best for you. Your provider may recommend that you drink an oral rehydration solution (ORS). ORS contains water, salts, and sugar that are needed to replace the lost body fluids. Ask what kind of ORS to use, how much to drink, and where to get it.      •Eat smaller meals, more often. Eat small amounts of food every 2 to 3 hours, even if you are not hungry. Food in your stomach may decrease your nausea.      •Talk to your healthcare provider before you take over-the-counter (OTC) medicines. These medicines can cause serious problems if you use certain other medicines, or you have a medical condition. You may have problems if you use too much or use them for longer than the label says. Follow directions on the label carefully.       When should I seek immediate care?   •You see blood in your vomit or your bowel movements.      •You have sudden, severe pain in your chest and upper abdomen after hard vomiting or retching.      •You have swelling in your neck and chest.       •You are dizzy, cold, and thirsty, and your eyes and mouth are dry.      •You are urinating very little or not at all.      •You have muscle weakness, leg cramps, and trouble breathing.      •Your heart is beating much faster than normal.      •You continue to vomit for more than 48 hours.       When should I contact my healthcare provider?   •You have frequent dry heaves (vomiting but nothing comes out).      •Your nausea and vomiting does not get better or go away after you use medicine.      •You have questions or concerns about your condition or care.      CARE AGREEMENT:    You have the right to help plan your care. Learn about your health condition and how it may be treated. Discuss treatment options with your healthcare providers to decide what care you want to receive. You always have the right to refuse treatment.

## 2023-12-04 NOTE — ED PROVIDER NOTE - CLINICAL SUMMARY MEDICAL DECISION MAKING FREE TEXT BOX
23-year-old male history of appendectomy presenting with upper abdominal pain associated NBNB emesis since last night.  Denies fever, diarrhea, sick contacts, urinary symptoms.  Patient well-appearing, positive epigastric tenderness on exam, negative Luis's.  Likely gastritis/gastroenteritis.  Plan for labs, symptom control and reassess.

## 2024-02-28 NOTE — ED PROVIDER NOTE - NSFOLLOWUPINSTRUCTIONS_ED_ALL_ED_FT
What Type Of Note Output Would You Prefer (Optional)?: Standard Output
Hpi Title: Evaluation of Skin Lesions
How Severe Are Your Spot(S)?: moderate
Have Your Spot(S) Been Treated In The Past?: has not been treated
Please return to the emergency department immediately should you feel worse in any way or have any of the following symptoms:    •	especially increased or different pain  •	 fevers  •	persistent vomiting  •	shaking chills     Please follow up with the Doctor listed within the time frame specified. Thank you for coming to the emergency department. We hope you are feeling improved and continue to get better. Have a nice day.

## 2024-04-23 ENCOUNTER — APPOINTMENT (OUTPATIENT)
Dept: FAMILY MEDICINE | Facility: CLINIC | Age: 24
End: 2024-04-23
Payer: MEDICAID

## 2024-04-23 VITALS
HEIGHT: 73 IN | SYSTOLIC BLOOD PRESSURE: 128 MMHG | BODY MASS INDEX: 27.96 KG/M2 | OXYGEN SATURATION: 98 % | WEIGHT: 211 LBS | TEMPERATURE: 97.2 F | HEART RATE: 86 BPM | DIASTOLIC BLOOD PRESSURE: 82 MMHG

## 2024-04-23 DIAGNOSIS — F19.11 OTHER PSYCHOACTIVE SUBSTANCE ABUSE, IN REMISSION: ICD-10-CM

## 2024-04-23 DIAGNOSIS — Z13.0 ENCOUNTER FOR SCREENING FOR OTHER SUSPECTED ENDOCRINE DISORDER: ICD-10-CM

## 2024-04-23 DIAGNOSIS — Z13.220 ENCOUNTER FOR SCREENING FOR LIPOID DISORDERS: ICD-10-CM

## 2024-04-23 DIAGNOSIS — Z00.00 ENCOUNTER FOR GENERAL ADULT MEDICAL EXAMINATION W/OUT ABNORMAL FINDINGS: ICD-10-CM

## 2024-04-23 DIAGNOSIS — Z13.29 ENCOUNTER FOR SCREENING FOR OTHER SUSPECTED ENDOCRINE DISORDER: ICD-10-CM

## 2024-04-23 DIAGNOSIS — F32.A DEPRESSION, UNSPECIFIED: ICD-10-CM

## 2024-04-23 DIAGNOSIS — Z13.228 ENCOUNTER FOR SCREENING FOR OTHER SUSPECTED ENDOCRINE DISORDER: ICD-10-CM

## 2024-04-23 PROCEDURE — 99395 PREV VISIT EST AGE 18-39: CPT

## 2024-04-23 RX ORDER — ESCITALOPRAM OXALATE 10 MG/1
10 TABLET ORAL DAILY
Qty: 90 | Refills: 0 | Status: COMPLETED | COMMUNITY
Start: 2023-04-25 | End: 2024-04-23

## 2024-04-23 RX ORDER — SUMATRIPTAN 25 MG/1
25 TABLET, FILM COATED ORAL
Qty: 1 | Refills: 1 | Status: COMPLETED | COMMUNITY
Start: 2021-09-25 | End: 2024-04-23

## 2024-04-23 RX ORDER — TRAZODONE HYDROCHLORIDE 50 MG/1
50 TABLET ORAL
Qty: 30 | Refills: 2 | Status: COMPLETED | COMMUNITY
Start: 2023-02-16 | End: 2024-04-23

## 2024-04-23 RX ORDER — ZOLPIDEM TARTRATE 5 MG/1
5 TABLET ORAL
Qty: 14 | Refills: 0 | Status: COMPLETED | COMMUNITY
Start: 2021-09-25 | End: 2024-04-23

## 2024-04-23 RX ORDER — IBUPROFEN 800 MG/1
800 TABLET, FILM COATED ORAL
Qty: 60 | Refills: 0 | Status: COMPLETED | COMMUNITY
Start: 2023-04-25 | End: 2024-04-23

## 2024-04-23 RX ORDER — LIDOCAINE HYDROCHLORIDE 20 MG/ML
2 SOLUTION ORAL; TOPICAL
Qty: 1 | Refills: 0 | Status: COMPLETED | COMMUNITY
Start: 2021-09-25 | End: 2024-04-23

## 2024-04-23 RX ORDER — OMEPRAZOLE 40 MG/1
40 CAPSULE, DELAYED RELEASE ORAL
Qty: 30 | Refills: 1 | Status: COMPLETED | COMMUNITY
Start: 2021-02-13 | End: 2024-04-23

## 2024-04-23 NOTE — HISTORY OF PRESENT ILLNESS
[de-identified] : Accompanied by gf Pt in office for CPE. Denies CP, palpitations, dyspnea, n/v. Pt f/u depression. Pt was feeling depressed in 2022, was abusing benzos, opioids, marijuana at that time. Pt reports he has been sober since 2022, was going to AA meetings but stopped 2 months. Depression worse recently w/ family issues. PHQ9 score 20 today, has been more fatigued. Declines meds today but willing to try CBT.  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

## 2024-04-23 NOTE — HEALTH RISK ASSESSMENT
[Never] : Never [3] : 2) Feeling down, depressed, or hopeless for nearly every day (3) [PHQ-2 Positive] : PHQ-2 Positive [Nearly Every Day (3)] : 6.) Feeling bad about yourself, or that you are a failure, or have let yourself or your family down? Nearly every day [1/2 of Days or More (2)] : 8.) Moving or speaking so slowly that other people could have noticed, or the opposite, moving or speaking faster than usual? Half the days or more [Not at All (0)] : 9.) Thoughts that you would be off dead or of hurting yourself in some way? Not at all [Severe] : Severity of Depression is Severe [Very Difficult] : How difficult have these problems made it for you to do your work, take care of things at home, or get along with people? Very difficult [PHQ-9 Positive] : PHQ-9 Positive [I have developed a follow-up plan documented below in the note.] : I have developed a follow-up plan documented below in the note. [[id number]] : 6 [[id number]] : 20

## 2024-04-23 NOTE — ASSESSMENT
[FreeTextEntry1] : depression - severe, pt declines meds at thsi time. start CBT refer to behavioral health manager  hx drug abuse - check urine drug screen Healthy diet and regular exercise regimen discussed w/ pt. Screening labs ordered advised contraception with condoms or alternatives flu vaccine during flu season recommended Any questions call office

## 2024-04-26 LAB
ALBUMIN SERPL ELPH-MCNC: 4.7 G/DL
ALP BLD-CCNC: 86 U/L
ALT SERPL-CCNC: 37 U/L
AMPHET UR-MCNC: NEGATIVE NG/ML
ANION GAP SERPL CALC-SCNC: 14 MMOL/L
APPEARANCE: CLEAR
AST SERPL-CCNC: 21 U/L
BACTERIA: NEGATIVE /HPF
BARBITURATES UR-MCNC: NEGATIVE NG/ML
BASOPHILS # BLD AUTO: 0.05 K/UL
BASOPHILS NFR BLD AUTO: 0.8 %
BENZODIAZ UR-MCNC: NEGATIVE NG/ML
BILIRUB SERPL-MCNC: 0.2 MG/DL
BILIRUBIN URINE: NEGATIVE
BLOOD URINE: NEGATIVE
BUN SERPL-MCNC: 12 MG/DL
CALCIUM SERPL-MCNC: 9.3 MG/DL
CAST: 0 /LPF
CHLORIDE SERPL-SCNC: 105 MMOL/L
CHOLEST SERPL-MCNC: 155 MG/DL
CO2 SERPL-SCNC: 23 MMOL/L
COCAINE METAB.OTHER UR-MCNC: NEGATIVE NG/ML
COLOR: YELLOW
CREAT SERPL-MCNC: 0.93 MG/DL
CREATININE, URINE: 111.1 MG/DL
EGFR: 118 ML/MIN/1.73M2
EOSINOPHIL # BLD AUTO: 0.25 K/UL
EOSINOPHIL NFR BLD AUTO: 4.2 %
EPITHELIAL CELLS: 0 /HPF
ESTIMATED AVERAGE GLUCOSE: 97 MG/DL
FENTANYL, URINE: NEGATIVE NG/ML
GLUCOSE QUALITATIVE U: NEGATIVE MG/DL
GLUCOSE SERPL-MCNC: 87 MG/DL
HBA1C MFR BLD HPLC: 5 %
HCT VFR BLD CALC: 44.8 %
HDLC SERPL-MCNC: 41 MG/DL
HGB BLD-MCNC: 15.5 G/DL
IMM GRANULOCYTES NFR BLD AUTO: 0.2 %
KETONES URINE: NEGATIVE MG/DL
LDLC SERPL CALC-MCNC: 104 MG/DL
LEUKOCYTE ESTERASE URINE: NEGATIVE
LYMPHOCYTES # BLD AUTO: 2.63 K/UL
LYMPHOCYTES NFR BLD AUTO: 44 %
MAN DIFF?: NORMAL
MCHC RBC-ENTMCNC: 29.1 PG
MCHC RBC-ENTMCNC: 34.6 GM/DL
MCV RBC AUTO: 84.1 FL
METHADONE UR-MCNC: NEGATIVE NG/ML
MICROSCOPIC-UA: NORMAL
MONOCYTES # BLD AUTO: 0.6 K/UL
MONOCYTES NFR BLD AUTO: 10 %
NEUTROPHILS # BLD AUTO: 2.44 K/UL
NEUTROPHILS NFR BLD AUTO: 40.8 %
NITRITE URINE: NEGATIVE
NONHDLC SERPL-MCNC: 115 MG/DL
OPIATES UR-MCNC: NEGATIVE NG/ML
OXYCODONE/OXYMORPHONE, URINE: NEGATIVE NG/ML
PCP UR-MCNC: NEGATIVE NG/ML
PH URINE: 7
PH, URINE: 7.3
PLATELET # BLD AUTO: 294 K/UL
PLEASE NOTE: DRUGSCRUR: NORMAL
POTASSIUM SERPL-SCNC: 4.3 MMOL/L
PROT SERPL-MCNC: 7.7 G/DL
PROTEIN URINE: NEGATIVE MG/DL
RBC # BLD: 5.33 M/UL
RBC # FLD: 13.2 %
RED BLOOD CELLS URINE: 0 /HPF
SODIUM SERPL-SCNC: 142 MMOL/L
SPECIFIC GRAVITY URINE: 1.02
T4 FREE SERPL-MCNC: 1.5 NG/DL
THC UR QL: NEGATIVE NG/ML
TRIGL SERPL-MCNC: 52 MG/DL
TSH SERPL-ACNC: 1.7 UIU/ML
UROBILINOGEN URINE: 0.2 MG/DL
WBC # FLD AUTO: 5.98 K/UL
WHITE BLOOD CELLS URINE: 0 /HPF

## 2024-06-10 ENCOUNTER — APPOINTMENT (OUTPATIENT)
Dept: FAMILY MEDICINE | Facility: CLINIC | Age: 24
End: 2024-06-10

## 2024-06-18 NOTE — DISCUSSION/SUMMARY
[FreeTextEntry1] : Mental Health History Today's Date  06- Patient Date of Birth 2000 Time Spent  75 minutes  History of past mental health treatment Have you ever been in mental health treatment in the past?   Yes  Prior Diagnosis (if known) What condition(s) were you in treatment for? Check any/all that apply Other Other Psychiatric Conditions treated for Patient reports being treated for substance use disorder in both inpatient and outpatient settings.   Inpatient History Were you ever treated in the emergency room or an inpatient hospital for Psychiatric reason?  No  Outpatient History Were you ever in psychotherapy or another outpatient treatment?  Yes Comments Patient reports outpatient treatment for LOUIS. Patient reports attending AA meetings until about two months ago.   Psychotropic Medication History Have you ever been prescribed a psychotropic medication (i.e. SSRI, benzo, mood stabilizer, anti-psychotic, etc.)?  No  Past Suicidality Previous attempt (ever in lifetime) None Known  C-SSR Screener (lifetime/recent) Passive Ideation Have you wished you were dead or wished you could go to sleep and not wake up?  No  Active Ideation Have you actually had any thoughts of killing yourself?  No  Suicidal Behavior Have you ever done anything, started to do anything, or prepared to do anything to end your life?  No  Additional details of suicidal ideation or suicidal behavior not mentioned in HPI. (include collateral information) Firearm Safety Do you have access to lethal means? No  History of Violence Have you ever engaged in physically violent behavior towards others or destruction of property?  No  Family History Is there a history of mental health concerns in your family?  Yes Comments Sister- Anxiety and Depression   Substance Abuse LOUIS History Have you ever had a problem with alcohol or drugs?    Yes Comments Patient has a history of drug use. Patient reports benzos, opioids, marijuana, and alcohol.   Treatment History Did you ever participate in any type LOUIS treatment? (Inpatient Rehab or Detox, Outpatient Treatment, Medication Assisted Treatment) Yes Comments Patient sought treatment in [place] for about a month and also attended NA and AA.   Medical History Do you suffer from any chronic medical conditions?  No  Do you suffer from chronic pain?  No  Social History (Adult) Primary Language Patient's Primary Language  English  Living Situation Living Situation Status  Family  Caregiver Status Do you serve as primary caregiver for any children or adults?  None  Gender Identity Patient's Gender Identity Male  Sexual Orientation Patient's Sexual Orientation Heterosexual  Relationship Status Patient's Relationship Status Committed relationship Other Details Patient reports being in a relationship for over year.   Trauma Patient Trauma  None reported  Employment Status Are you currently employed?  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   SDOH SDOH Concerns None  History of Present Illness Current Concerns/Chief Complaints  Patient is a 23 y/o male presenting with symptoms of anxiety and depression. 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 Patient reports wanting to sell the company however he is not able to at this time. Patient reports having a goal to do something other than [employer] however he did not receive the support from his parents. Patient also reports having a medical background however that is not his passion. Patient reports feeling stuck right now. Patient also reports difficulty while living in his parent's home as he is often unable to relax and at times his family treats him as if he is using again. Patient reports increased shakiness as a result of the anxiety. Patient expressed interest in seeking medication to assist with symptoms in addition to talk therapy.   Current Symptom screening a. Depressive symptoms  {PHQ-9  }- Writer administered PHQ-9 screening. Patient scored an 18 indicating patient meets diagnostic criteria for depression.  b. SI/Safety- Writer assessed for safety. No safety concerns noted at this time.  c. Anxiety symptoms  {GIBSON-7  }- Writer administered GIBSON-7 screening. Patient scored a 15 indicating patient meets diagnostic criteria for anxiety.  d. Tara- None reported.  e. Psychosis- None reported.  f. LOUIS- None reported.  g. ADHD/Disruptive Behavior- None reported.   Initial Care Plan Specify Problems  Anxiety  Depression   Specify Interventions  Telepsychiatry Consult Care Coordination  Intervention Details Patient meets diagnostic criteria for anxiety and depression and will benefit from long-term therapy. Patient is interested in exploring medication to manage symptoms and will benefit from a psychiatric consult. Writer to provide resources to long-term therapists. Writer to place referral for psychiatric consult. Writer will assist with linkage as needed. Patient in agreement.

## 2025-04-03 ENCOUNTER — APPOINTMENT (OUTPATIENT)
Dept: FAMILY MEDICINE | Facility: CLINIC | Age: 25
End: 2025-04-03
Payer: MEDICAID

## 2025-04-03 ENCOUNTER — LABORATORY RESULT (OUTPATIENT)
Age: 25
End: 2025-04-03

## 2025-04-03 VITALS
HEART RATE: 62 BPM | OXYGEN SATURATION: 98 % | WEIGHT: 181 LBS | DIASTOLIC BLOOD PRESSURE: 80 MMHG | BODY MASS INDEX: 23.99 KG/M2 | TEMPERATURE: 97.6 F | SYSTOLIC BLOOD PRESSURE: 124 MMHG | HEIGHT: 73 IN

## 2025-04-03 DIAGNOSIS — F19.11 OTHER PSYCHOACTIVE SUBSTANCE ABUSE, IN REMISSION: ICD-10-CM

## 2025-04-03 DIAGNOSIS — Z13.220 ENCOUNTER FOR SCREENING FOR LIPOID DISORDERS: ICD-10-CM

## 2025-04-03 DIAGNOSIS — F41.9 ANXIETY DISORDER, UNSPECIFIED: ICD-10-CM

## 2025-04-03 PROCEDURE — 99214 OFFICE O/P EST MOD 30 MIN: CPT

## 2025-04-08 LAB
ALBUMIN SERPL ELPH-MCNC: 4.4 G/DL
ALP BLD-CCNC: 93 U/L
ALT SERPL-CCNC: 56 U/L
ANION GAP SERPL CALC-SCNC: 14 MMOL/L
AST SERPL-CCNC: 38 U/L
BASOPHILS # BLD AUTO: 0.04 K/UL
BASOPHILS NFR BLD AUTO: 0.7 %
BILIRUB SERPL-MCNC: 0.3 MG/DL
BUN SERPL-MCNC: 14 MG/DL
CALCIUM SERPL-MCNC: 9.1 MG/DL
CHLORIDE SERPL-SCNC: 105 MMOL/L
CO2 SERPL-SCNC: 22 MMOL/L
CREAT SERPL-MCNC: 0.81 MG/DL
EGFRCR SERPLBLD CKD-EPI 2021: 125 ML/MIN/1.73M2
EOSINOPHIL # BLD AUTO: 0.14 K/UL
EOSINOPHIL NFR BLD AUTO: 2.6 %
ESTIMATED AVERAGE GLUCOSE: 103 MG/DL
GLUCOSE SERPL-MCNC: 113 MG/DL
HBA1C MFR BLD HPLC: 5.2 %
HCT VFR BLD CALC: 43.2 %
HGB BLD-MCNC: 14.8 G/DL
IMM GRANULOCYTES NFR BLD AUTO: 0.2 %
LYMPHOCYTES # BLD AUTO: 2.49 K/UL
LYMPHOCYTES NFR BLD AUTO: 45.7 %
MAN DIFF?: NORMAL
MCHC RBC-ENTMCNC: 29.8 PG
MCHC RBC-ENTMCNC: 34.3 G/DL
MCV RBC AUTO: 87.1 FL
MONOCYTES # BLD AUTO: 0.54 K/UL
MONOCYTES NFR BLD AUTO: 9.9 %
NEUTROPHILS # BLD AUTO: 2.23 K/UL
NEUTROPHILS NFR BLD AUTO: 40.9 %
PLATELET # BLD AUTO: 345 K/UL
POTASSIUM SERPL-SCNC: 3.8 MMOL/L
PROT SERPL-MCNC: 7.3 G/DL
RBC # BLD: 4.96 M/UL
RBC # FLD: 13.4 %
SODIUM SERPL-SCNC: 142 MMOL/L
T4 FREE SERPL-MCNC: 1.1 NG/DL
TSH SERPL-ACNC: 1.65 UIU/ML
WBC # FLD AUTO: 5.45 K/UL

## 2025-04-11 LAB
AMPHET UR-MCNC: NEGATIVE NG/ML
BARBITURATES UR-MCNC: NEGATIVE NG/ML
BENZODIAZ UR-MCNC: NEGATIVE NG/ML
COCAINE METAB.OTHER UR-MCNC: NEGATIVE NG/ML
CREATININE, URINE: 144.9 MG/DL
FENTANYL, URINE: NEGATIVE NG/ML
METHADONE SCREEN, UR: NEGATIVE NG/ML
OPIATES UR-MCNC: NEGATIVE NG/ML
OXYCODONE/OXYMORPHONE, URINE: NEGATIVE NG/ML
PCP UR-MCNC: NEGATIVE NG/ML
PH, URINE: 5.5
THC UR QL: NORMAL NG/ML

## 2025-05-20 ENCOUNTER — NON-APPOINTMENT (OUTPATIENT)
Age: 25
End: 2025-05-20

## 2025-05-22 ENCOUNTER — APPOINTMENT (OUTPATIENT)
Dept: FAMILY MEDICINE | Facility: CLINIC | Age: 25
End: 2025-05-22
Payer: MEDICAID

## 2025-05-22 VITALS
HEART RATE: 87 BPM | SYSTOLIC BLOOD PRESSURE: 122 MMHG | BODY MASS INDEX: 24.52 KG/M2 | DIASTOLIC BLOOD PRESSURE: 78 MMHG | TEMPERATURE: 97.5 F | HEIGHT: 73 IN | OXYGEN SATURATION: 98 % | WEIGHT: 185 LBS

## 2025-05-22 DIAGNOSIS — M25.569 PAIN IN UNSPECIFIED KNEE: ICD-10-CM

## 2025-05-22 PROCEDURE — 99213 OFFICE O/P EST LOW 20 MIN: CPT

## 2025-05-22 RX ORDER — MELOXICAM 7.5 MG/1
7.5 TABLET ORAL
Qty: 60 | Refills: 0 | Status: ACTIVE | COMMUNITY
Start: 2025-05-22 | End: 1900-01-01

## 2025-06-05 ENCOUNTER — APPOINTMENT (OUTPATIENT)
Dept: ORTHOPEDIC SURGERY | Facility: CLINIC | Age: 25
End: 2025-06-05
Payer: MEDICAID

## 2025-06-05 VITALS — HEIGHT: 73 IN | BODY MASS INDEX: 24.52 KG/M2 | WEIGHT: 185 LBS

## 2025-06-05 DIAGNOSIS — M23.92 UNSPECIFIED INTERNAL DERANGEMENT OF LEFT KNEE: ICD-10-CM

## 2025-06-05 PROCEDURE — 73564 X-RAY EXAM KNEE 4 OR MORE: CPT | Mod: LT

## 2025-06-05 PROCEDURE — 99213 OFFICE O/P EST LOW 20 MIN: CPT

## 2025-06-16 ENCOUNTER — APPOINTMENT (OUTPATIENT)
Dept: MRI IMAGING | Facility: CLINIC | Age: 25
End: 2025-06-16
Payer: MEDICAID

## 2025-06-16 ENCOUNTER — OUTPATIENT (OUTPATIENT)
Dept: OUTPATIENT SERVICES | Facility: HOSPITAL | Age: 25
LOS: 1 days | End: 2025-06-16
Payer: COMMERCIAL

## 2025-06-16 DIAGNOSIS — M23.92 UNSPECIFIED INTERNAL DERANGEMENT OF LEFT KNEE: ICD-10-CM

## 2025-06-16 PROCEDURE — 73721 MRI JNT OF LWR EXTRE W/O DYE: CPT | Mod: 26,LT

## 2025-06-16 PROCEDURE — 73721 MRI JNT OF LWR EXTRE W/O DYE: CPT

## 2025-06-18 ENCOUNTER — TRANSCRIPTION ENCOUNTER (OUTPATIENT)
Age: 25
End: 2025-06-18

## 2025-07-07 ENCOUNTER — APPOINTMENT (OUTPATIENT)
Dept: ORTHOPEDIC SURGERY | Facility: CLINIC | Age: 25
End: 2025-07-07